# Patient Record
Sex: MALE | Race: WHITE | NOT HISPANIC OR LATINO | Employment: OTHER | URBAN - METROPOLITAN AREA
[De-identification: names, ages, dates, MRNs, and addresses within clinical notes are randomized per-mention and may not be internally consistent; named-entity substitution may affect disease eponyms.]

---

## 2017-02-23 ENCOUNTER — HOSPITAL ENCOUNTER (OUTPATIENT)
Facility: MEDICAL CENTER | Age: 52
End: 2017-02-23
Attending: FAMILY MEDICINE
Payer: COMMERCIAL

## 2017-02-23 ENCOUNTER — NON-PROVIDER VISIT (OUTPATIENT)
Dept: INTERNAL MEDICINE | Facility: IMAGING CENTER | Age: 52
End: 2017-02-23
Payer: COMMERCIAL

## 2017-02-23 DIAGNOSIS — Z01.89 ENCOUNTER FOR ROUTINE LABORATORY TESTING: Primary | ICD-10-CM

## 2017-02-23 DIAGNOSIS — E88.819 INSULIN RESISTANCE: ICD-10-CM

## 2017-02-23 DIAGNOSIS — R73.01 ELEVATED FASTING GLUCOSE: ICD-10-CM

## 2017-02-23 DIAGNOSIS — I77.6 INFLAMMATION OF ARTERIES (HCC): ICD-10-CM

## 2017-02-23 DIAGNOSIS — Z87.19 HISTORY OF LIVER DISEASE: ICD-10-CM

## 2017-02-23 DIAGNOSIS — E78.2 MIXED HYPERLIPIDEMIA WITH APOLIPOPROTEIN E3 VARIANT: ICD-10-CM

## 2017-02-23 LAB
ALBUMIN SERPL BCP-MCNC: 4.1 G/DL (ref 3.2–4.9)
ALBUMIN/GLOB SERPL: 1.3 G/DL
ALP SERPL-CCNC: 69 U/L (ref 30–99)
ALT SERPL-CCNC: 19 U/L (ref 2–50)
ANION GAP SERPL CALC-SCNC: 6 MMOL/L (ref 0–11.9)
AST SERPL-CCNC: 21 U/L (ref 12–45)
BILIRUB SERPL-MCNC: 1.5 MG/DL (ref 0.1–1.5)
BUN SERPL-MCNC: 15 MG/DL (ref 8–22)
CALCIUM SERPL-MCNC: 9.9 MG/DL (ref 8.5–10.5)
CHLORIDE SERPL-SCNC: 102 MMOL/L (ref 96–112)
CO2 SERPL-SCNC: 28 MMOL/L (ref 20–33)
CREAT SERPL-MCNC: 0.92 MG/DL (ref 0.5–1.4)
CREAT UR-MCNC: 67.3 MG/DL
CRP SERPL HS-MCNC: 0.7 MG/L (ref 0–7.5)
EST. AVERAGE GLUCOSE BLD GHB EST-MCNC: 100 MG/DL
GLOBULIN SER CALC-MCNC: 3.1 G/DL (ref 1.9–3.5)
GLUCOSE SERPL-MCNC: 94 MG/DL (ref 65–99)
HBA1C MFR BLD: 5.1 % (ref 0–5.6)
MICROALBUMIN UR-MCNC: <0.7 MG/DL
MICROALBUMIN/CREAT UR: NORMAL MG/G (ref 0–30)
POTASSIUM SERPL-SCNC: 4.1 MMOL/L (ref 3.6–5.5)
PROT SERPL-MCNC: 7.2 G/DL (ref 6–8.2)
SODIUM SERPL-SCNC: 136 MMOL/L (ref 135–145)

## 2017-02-23 PROCEDURE — 82172 ASSAY OF APOLIPOPROTEIN: CPT

## 2017-02-23 PROCEDURE — 82570 ASSAY OF URINE CREATININE: CPT

## 2017-02-23 PROCEDURE — 83036 HEMOGLOBIN GLYCOSYLATED A1C: CPT

## 2017-02-23 PROCEDURE — 83704 LIPOPROTEIN BLD QUAN PART: CPT

## 2017-02-23 PROCEDURE — 80061 LIPID PANEL: CPT

## 2017-02-23 PROCEDURE — 80053 COMPREHEN METABOLIC PANEL: CPT

## 2017-02-23 PROCEDURE — 86141 C-REACTIVE PROTEIN HS: CPT

## 2017-02-23 PROCEDURE — 82043 UR ALBUMIN QUANTITATIVE: CPT

## 2017-02-25 LAB — APO B100 SERPL-MCNC: 103 MG/DL (ref 55–140)

## 2017-02-28 LAB
CHOLEST SERPL-MCNC: 215 MG/DL (ref 100–199)
HDL PARTICAL NO Q4363: 28 UMOL/L
HDL SERPL QN: 8.9 NM
HDLC SERPL-MCNC: 48 MG/DL
HLD.LARGE SERPL-SCNC: 4.5 UMOL/L
LDL MED SERPL QN: 21.5 NM
LDL SERPL QN: 21.5 NM
LDL SERPL-SCNC: 1679 NMOL/L
LDL SMALL SERPL-SCNC: 354 NMOL/L
LDL SMALL SERPL-SCNC: 354 NMOL/L
LDLC SERPL CALC-MCNC: 136 MG/DL (ref 0–99)
LP IR SCORE Q4364: 41
TRIGL SERPL-MCNC: 156 MG/DL (ref 0–149)
VLDL LARGE SERPL-SCNC: 2.5 NMOL/L
VLDL SERPL QN: 45.3 NM

## 2017-03-22 ENCOUNTER — OFFICE VISIT (OUTPATIENT)
Dept: INTERNAL MEDICINE | Facility: IMAGING CENTER | Age: 52
End: 2017-03-22
Payer: COMMERCIAL

## 2017-03-22 VITALS
HEART RATE: 77 BPM | OXYGEN SATURATION: 99 % | HEIGHT: 71 IN | RESPIRATION RATE: 14 BRPM | TEMPERATURE: 97.2 F | SYSTOLIC BLOOD PRESSURE: 120 MMHG | DIASTOLIC BLOOD PRESSURE: 74 MMHG | BODY MASS INDEX: 25.9 KG/M2 | WEIGHT: 185 LBS

## 2017-03-22 DIAGNOSIS — Z83.3 FAMILY HISTORY OF DIABETES MELLITUS TYPE II: ICD-10-CM

## 2017-03-22 DIAGNOSIS — Z91.09 ENVIRONMENTAL ALLERGIES: ICD-10-CM

## 2017-03-22 DIAGNOSIS — F17.220 CHEWING TOBACCO NICOTINE DEPENDENCE WITHOUT COMPLICATION: ICD-10-CM

## 2017-03-22 DIAGNOSIS — E78.2 MIXED HYPERLIPIDEMIA WITH APOLIPOPROTEIN E3 VARIANT: Primary | ICD-10-CM

## 2017-03-22 PROCEDURE — 99214 OFFICE O/P EST MOD 30 MIN: CPT | Performed by: FAMILY MEDICINE

## 2017-03-22 RX ORDER — CETIRIZINE HYDROCHLORIDE 10 MG/1
10 TABLET ORAL DAILY
COMMUNITY
End: 2020-07-13

## 2017-03-22 RX ORDER — VARENICLINE TARTRATE 1 MG/1
1 TABLET, FILM COATED ORAL 2 TIMES DAILY
Qty: 60 TAB | Refills: 3 | Status: SHIPPED
Start: 2017-03-22 | End: 2018-04-10 | Stop reason: SDUPTHER

## 2017-03-22 NOTE — MR AVS SNAPSHOT
"Avila Wood   3/22/2017 8:30 AM   Office Visit   MRN: [id number]    Department:  [facility]   Dept Phone:  [phone]    Description:  Male : 1965   Provider:  Ja Rey M.D.           Reason for Visit     Allergic Reaction seasonal allergies    Hyperlipidemia     Nicotine Dependence with Chewing tobacco      Allergies as of 3/22/2017     No Known Allergies      You were diagnosed with     Mixed hyperlipidemia with apolipoprotein E3 variant   [152391]  -  Primary     Environmental allergies   [880444]       Chewing tobacco nicotine dependence without complication   [683554]       Family history of diabetes mellitus type II   [627992]         Vital Signs     Blood Pressure Pulse Temperature Respirations Height Weight    120/74 mmHg 77 36.2 °C (97.2 °F) 14 1.816 m (5' 11.5\") 83.915 kg (185 lb)    Body Mass Index Oxygen Saturation Smoking Status             25.45 kg/m2 99% Never Smoker          Basic Information     Date Of Birth Sex Race Ethnicity Preferred Language    1965 Male White Non- English      Problem List              ICD-10-CM Priority Class Noted - Resolved    Vitamin D deficiency disease E55.9   10/10/2013 - Present    Chewing tobacco use Z78.9   10/10/2013 - Present    Overweight (BMI 25.0-29.9) E66.3   10/10/2013 - Present    Family history of diabetes mellitus type II Z83.3   10/10/2013 - Present    History of LFT elevation Z87.19   10/10/2013 - Present    Environmental allergies Z91.09   10/10/2013 - Present    Family history of hypertension Z82.49   10/10/2013 - Present    Mixed hyperlipidemia with apolipoprotein E3 variant E78.2   2013 - Present    Inflammation of arteries (CMS-HCC) M30.0   2014 - Present    Elevated fasting glucose R73.01   2016 - Present    Insulin resistance E88.81   2016 - Present    Chewing tobacco nicotine dependence without complication F17.220   2016 - Present      Health Maintenance        Date Due " Completion Dates    IMM PNEUMOCOCCAL 19-64 (ADULT) MEDIUM RISK SERIES (1 of 1 - PPSV23) 3/27/1986 ---    IMM DTaP/Tdap/Td Vaccine (2 - Td) 10/10/2023 10/10/2013            Current Immunizations     Influenza Vaccine Quad Inj (Preserved) 9/27/2016, 10/13/2015, 11/26/2014    Tdap Vaccine 10/10/2013      Below and/or attached are the medications your provider expects you to take. Review all of your home medications and newly ordered medications with your provider and/or pharmacist. Follow medication instructions as directed by your provider and/or pharmacist. Please keep your medication list with you and share with your provider. Update the information when medications are discontinued, doses are changed, or new medications (including over-the-counter products) are added; and carry medication information at all times in the event of emergency situations     Allergies:  No Known Allergies          Medications  Valid as of: March 22, 2017 -  1:29 PM    Generic Name Brand Name Tablet Size Instructions for use    Aspirin (Tablet Delayed Response) ECOTRIN 81 MG Take 1 Tab by mouth every day.        Cetirizine HCl (Tab) ZYRTEC 10 MG Take 10 mg by mouth every day.        Cholecalciferol (Cap) vitamin D3 5000 UNITS Take 1 Cap by mouth every day.        Flaxseed Oil (Oil) Flaxseed Oil  Take 1 Each by mouth every day.        Multiple Vitamins-Minerals (Tab) CENTRUM  Take 1 Each by mouth every day.        Varenicline Tartrate (Misc) CHANTIX JOSE 0.5 MG X 11 & 1 MG X 42 As directed        Varenicline Tartrate (Tab) CHANTIX 1 MG Take 1 Tab by mouth 2 times a day.        .                 Medicines prescribed today were sent to:     [id number] - [place], [place] - [address] AT [facility]    [address] [address]    Phone: [phone] Fax: [phone]    Open 24 Hours?: No      Medication refill instructions:       If your prescription bottle indicates you have medication  refills left, it is not necessary to call your provider’s office. Please contact your pharmacy and they will refill your medication.    If your prescription bottle indicates you do not have any refills left, you may request refills at any time through one of the following ways: The online [employer] system (except Urgent Care), by calling your provider’s office, or by asking your pharmacy to contact your provider’s office with a refill request. Medication refills are processed only during regular business hours and may not be available until the next business day. Your provider may request additional information or to have a follow-up visit with you prior to refilling your medication.   *Please Note: Medication refills are assigned a new Rx number when refilled electronically. Your pharmacy may indicate that no refills were authorized even though a new prescription for the same medication is available at the pharmacy. Please request the medicine by name with the pharmacy before contacting your provider for a refill.        Instructions    Current Outpatient Prescriptions Ordered in [facility]   Medication Sig Dispense Refill   • cetirizine (ZYRTEC) 10 MG Tab Take 10 mg by mouth every day.     • varenicline (CHANTIX STARTING MONTH JOSE) 0.5 MG X 11 & 1 MG X 42 tablet As directed 56 Tab 3   • varenicline (CHANTIX CONTINUING MONTH JOSE) 1 MG tablet Take 1 Tab by mouth 2 times a day. 60 Tab 3   • Cholecalciferol (VITAMIN D3) 5000 UNITS CAPS Take 1 Cap by mouth every day.     • Flaxseed Oil OIL Take 1 Each by mouth every day.     • aspirin EC (ECOTRIN) 81 MG TBEC Take 1 Tab by mouth every day.     • Multiple Vitamins-Minerals (CENTRUM) TABS Take 1 Each by mouth every day.       No current [facility]-ordered facility-administered medications on file.                        Other Notes About Your Plan                          [employer] Access Code: Activation code not generated  Current [employer] Status: Active          Quit Tobacco  Information     Do you want to quit using tobacco?    Quitting tobacco decreases risks of cancer, heart and lung disease, increases life expectancy, improves sense of taste and smell, and increases spending money, among other benefits.    If you are thinking about quitting, we can help.  • [facility] Quit Tobacco Program: [phone]  o Program occurs weekly for four weeks and includes pharmacist consultation on products to support quitting smoking or chewing tobacco. A provider referral is needed for pharmacist consultation.  • Tobacco Users Help Hotline: 0-990-QUIT-NOW (981-0438) or https://[place].quitlogix.org/  o Free, confidential telephone and online coaching for [place] residents. Sessions are designed on a schedule that is convenient for you. Eligible clients receive free nicotine replacement therapy.  • Nationally: www.smokefree.gov  o Information and professional assistance to support both immediate and long-term needs as you become, and remain, a non-smoker. Smokefree.gov allows you to choose the help that best fits your needs.

## 2017-03-22 NOTE — PROGRESS NOTES
SUBJECTIVE:    Chief Complaint   Patient presents with   • Allergic Reaction     seasonal allergies   • Hyperlipidemia   • Nicotine Dependence     with Chewing tobacco       Avila Wood is a 51 y.o. male,   Established Patient     PROBLEM #1-HISTORY OF PRESENT ILLNESS  New Problem  PATIENT STATEMENT OF PROBLEM - Seasonal allergies  ONSET - 3 days  COURSE - scratchy throat, sneezing, itchy eyes. OTC Claritin worked well, but he tried a 24 hour Claritin D last PM and he did not like the racing mind, insomnia that went with it.   INTENSITY/STATUS/LOCATION/RADIATION - mild/ improved  AGGRAVATORS - seasonal  RELIEVERS - as above  TREATMENTS/COMPLIANCE/@GOAL? - same/ good/ mostly @ goal    PROBLEM #2-HISTORY OF PRESENT ILLNESS  Existing Problem, but requiring re-evaluation  PATIENT STATEMENT OF PROBLEM - Dyslipidemia  ONSET - years  COURSE - Asymptomatic. No CVD/CeVD event history. Previous Imaging reveals no evidence of atherosclerosis.  He is still chewing tobacco, and I strongly encouraged him to quit. Current pertinent labs:   HIGH & INT RISK: LDL-P/Tchol/L-HDL-P/TRIG/LDL-C/HDL-S/HDL-P/ApoB  INTENSITY/STATUS/LOCATION/RADIATION - moderate/ present  AGGRAVATORS - Multifactorial including genetics, tobacco, some high fat/calorie dense foods  RELIEVERS - he is normal weight via diet/exercise  TREATMENTS/COMPLIANCE/@GOAL? - same/ variable/ no     No Known Allergies    Patient Active Problem List    Diagnosis Date Noted   • Elevated fasting glucose 09/27/2016   • Insulin resistance 09/27/2016   • Chewing tobacco nicotine dependence without complication 09/27/2016   • Inflammation of arteries (CMS-HCC) 09/18/2014   • Mixed hyperlipidemia with apolipoprotein E3 variant 11/13/2013   • Vitamin D deficiency disease 10/10/2013   • Chewing tobacco use 10/10/2013   • Overweight (BMI 25.0-29.9) 10/10/2013   • Family history of diabetes mellitus type II 10/10/2013   • History of LFT elevation 10/10/2013   • Environmental allergies  "10/10/2013   • Family history of hypertension 10/10/2013       Outpatient Encounter Prescriptions as of 3/22/2017   Medication Sig Dispense Refill   • cetirizine (ZYRTEC) 10 MG Tab Take 10 mg by mouth every day.     • varenicline (CHANTIX STARTING MONTH PAK) 0.5 MG X 11 & 1 MG X 42 tablet As directed 56 Tab 3   • varenicline (CHANTIX CONTINUING MONTH PAK) 1 MG tablet Take 1 Tab by mouth 2 times a day. 60 Tab 3   • Cholecalciferol (VITAMIN D3) 5000 UNITS CAPS Take 1 Cap by mouth every day.     • Flaxseed Oil OIL Take 1 Each by mouth every day.     • aspirin EC (ECOTRIN) 81 MG TBEC Take 1 Tab by mouth every day.     • Multiple Vitamins-Minerals (CENTRUM) TABS Take 1 Each by mouth every day.       No facility-administered encounter medications on file as of 3/22/2017.       Social History   Substance Use Topics   • Smoking status: Never Smoker    • Smokeless tobacco: Current User     Types: Chew   • Alcohol Use: 1.2 oz/week     2 Standard drinks or equivalent per week       Family History   Problem Relation Age of Onset   • Diabetes Father    • Hypertension Father        Patient's Past, Social, and Family History reviewed and updated by me in EPIC today.    REVIEW OF SYMPTOMS:               Pertinent Positives as above.    All other systems reviewed and negative.     OBJECTIVE:    /74 mmHg  Pulse 77  Temp(Src) 36.2 °C (97.2 °F)  Resp 14  Ht 1.816 m (5' 11.5\")  Wt 83.915 kg (185 lb)  BMI 25.45 kg/m2  SpO2 99%  Body mass index is 25.45 kg/(m^2).    Well developed, well nourished male, no acute distress, non-ill appearing. Comfortable, appears stated age, pleasant and cooperative  HEAD: atraumatic, normocephalic   EYES: Conjunctiva normal, EOMI, PERRLA, acuity grossly intact.   EARS/NOSE/THROAT: TM's normal, no SSX of infection, no perforation, no hemotympanum, acuity grossly intact. Oropharynx: benign, no lesions noted. Nares: benign.   NECK: supple, no adenopathy, no thyromegaly or nodules, no JVD, no " carotid bruits.   CHEST/LUNGS: clear to auscultation and percussion bilaterally. No adventitious breath sounds.   CARDIOVASCULAR: regular rate and rhythm, no murmur. PMI not displaced. Good central and peripheral pulses.   BACK: no CVA tenderness.   ABDOMEN: soft, non-tender, non-distended, no masses, no hepatosplenomegaly. Normal active bowel tones.   : deferred.   Rectal: deferred.   Extremities: warm/well-perfused, no cyanosis, clubbing, or edema.   SKIN: clear, unbroken, no rashes, normal turgor.   Neuro: Mental Status: Alert and Oriented x 3. CN II-XII grossly intact. Gait normal. Non-focal, intact. Normal strength, sensation    ASSESSMENT:    1. Mixed hyperlipidemia with apolipoprotein E3 variant     2. Environmental allergies     3. Chewing tobacco nicotine dependence without complication  varenicline (CHANTIX STARTING MONTH PAK) 0.5 MG X 11 & 1 MG X 42 tablet    varenicline (CHANTIX CONTINUING MONTH JOSE) 1 MG tablet   4. Family history of diabetes mellitus type II         PLAN:    Total Face-to-Face time spent with patient: 30 minutes  Amount of time spent counseling patient and/or coordinating care: 20 minutes    The nature of patient counseling as below:  -Patient Education, including below topics:  -Differential Diagnoses and treatment options discussed  -Risks, benefits, alternatives discussed  -Labs reviewed with patient in detail  -Previous pertinent imaging reviewed with patient in detail  -Exercise  -Dietary recommendations discussed  -QUIT CHEWING TOBACCO!!!  -Therapeutic Lifestyle Changes discussed    The nature of coordination of care as below:  -Medications added: Chantix  -Medications discontinued: none  -Medications adjusted: none  -Continue (other) present chronic medications  -Seek medical attention immediately if worse    FOLLOW-UP:  -in 6 months  -and sooner if test/consult results warrant  And for Health Care Maintenance Exams  And as needed.

## 2017-03-22 NOTE — PATIENT INSTRUCTIONS
Current Outpatient Prescriptions Ordered in [facility]   Medication Sig Dispense Refill   • cetirizine (ZYRTEC) 10 MG Tab Take 10 mg by mouth every day.     • varenicline (CHANTIX STARTING MONTH PAK) 0.5 MG X 11 & 1 MG X 42 tablet As directed 56 Tab 3   • varenicline (CHANTIX CONTINUING MONTH PAK) 1 MG tablet Take 1 Tab by mouth 2 times a day. 60 Tab 3   • Cholecalciferol (VITAMIN D3) 5000 UNITS CAPS Take 1 Cap by mouth every day.     • Flaxseed Oil OIL Take 1 Each by mouth every day.     • aspirin EC (ECOTRIN) 81 MG TBEC Take 1 Tab by mouth every day.     • Multiple Vitamins-Minerals (CENTRUM) TABS Take 1 Each by mouth every day.       No current [facility]-ordered facility-administered medications on file.

## 2017-03-31 ENCOUNTER — OFFICE VISIT (OUTPATIENT)
Dept: INTERNAL MEDICINE | Facility: IMAGING CENTER | Age: 52
End: 2017-03-31
Payer: COMMERCIAL

## 2017-03-31 VITALS
OXYGEN SATURATION: 97 % | DIASTOLIC BLOOD PRESSURE: 60 MMHG | WEIGHT: 185 LBS | SYSTOLIC BLOOD PRESSURE: 110 MMHG | RESPIRATION RATE: 14 BRPM | BODY MASS INDEX: 25.9 KG/M2 | HEIGHT: 71 IN | TEMPERATURE: 98.6 F | HEART RATE: 83 BPM

## 2017-03-31 DIAGNOSIS — J01.00 ACUTE MAXILLARY SINUSITIS, RECURRENCE NOT SPECIFIED: ICD-10-CM

## 2017-03-31 DIAGNOSIS — L03.011 INFECTION OF FINGERNAIL OF RIGHT HAND: ICD-10-CM

## 2017-03-31 PROCEDURE — 99214 OFFICE O/P EST MOD 30 MIN: CPT | Performed by: FAMILY MEDICINE

## 2017-03-31 RX ORDER — AMOXICILLIN AND CLAVULANATE POTASSIUM 875; 125 MG/1; MG/1
1 TABLET, FILM COATED ORAL 2 TIMES DAILY
Qty: 20 TAB | Refills: 0 | Status: SHIPPED | OUTPATIENT
Start: 2017-03-31 | End: 2017-04-10

## 2017-03-31 NOTE — MR AVS SNAPSHOT
"Avila Wood   3/31/2017 2:30 PM   Office Visit   MRN: [id number]    Department:  [facility]   Dept Phone:  [phone]    Description:  Male : 1965   Provider:  Jayna Martinez M.D.           Reason for Visit     Sinus Problem     Finger Injury right third finger infection      Allergies as of 3/31/2017     No Known Allergies      You were diagnosed with     Acute maxillary sinusitis, recurrence not specified   [1672938]       Infection of fingernail of right hand   [4040351]         Vital Signs     Blood Pressure Pulse Temperature Respirations Height Weight    110/60 mmHg 83 37 °C (98.6 °F) 14 1.816 m (5' 11.5\") 83.915 kg (185 lb)    Body Mass Index Oxygen Saturation Smoking Status             25.45 kg/m2 97% Never Smoker          Basic Information     Date Of Birth Sex Race Ethnicity Preferred Language    1965 Male White Non- English      Problem List              ICD-10-CM Priority Class Noted - Resolved    Vitamin D deficiency disease E55.9   10/10/2013 - Present    Chewing tobacco use Z78.9   10/10/2013 - Present    Overweight (BMI 25.0-29.9) E66.3   10/10/2013 - Present    Family history of diabetes mellitus type II Z83.3   10/10/2013 - Present    History of LFT elevation Z87.19   10/10/2013 - Present    Environmental allergies Z91.09   10/10/2013 - Present    Family history of hypertension Z82.49   10/10/2013 - Present    Mixed hyperlipidemia with apolipoprotein E3 variant E78.2   2013 - Present    Inflammation of arteries (CMS-HCC) M30.0   2014 - Present    Elevated fasting glucose R73.01   2016 - Present    Insulin resistance E88.81   2016 - Present    Chewing tobacco nicotine dependence without complication F17.220   2016 - Present      Health Maintenance        Date Due Completion Dates    IMM PNEUMOCOCCAL 19-64 (ADULT) MEDIUM RISK SERIES (1 of 1 - PPSV23) 3/27/1986 ---    COLONOSCOPY 3/27/2017 ---    IMM DTaP/Tdap/Td Vaccine (2 - Td) " 10/10/2023 10/10/2013            Current Immunizations     Influenza Vaccine Quad Inj (Preserved) 9/27/2016, 10/13/2015, 11/26/2014    Tdap Vaccine 10/10/2013      Below and/or attached are the medications your provider expects you to take. Review all of your home medications and newly ordered medications with your provider and/or pharmacist. Follow medication instructions as directed by your provider and/or pharmacist. Please keep your medication list with you and share with your provider. Update the information when medications are discontinued, doses are changed, or new medications (including over-the-counter products) are added; and carry medication information at all times in the event of emergency situations     Allergies:  No Known Allergies          Medications  Valid as of: March 31, 2017 -  3:27 PM    Generic Name Brand Name Tablet Size Instructions for use    Amoxicillin-Pot Clavulanate (Tab) AUGMENTIN 875-125 MG Take 1 Tab by mouth 2 times a day for 10 days.        Aspirin (Tablet Delayed Response) ECOTRIN 81 MG Take 1 Tab by mouth every day.        Cetirizine HCl (Tab) ZYRTEC 10 MG Take 10 mg by mouth every day.        Cholecalciferol (Cap) vitamin D3 5000 UNITS Take 1 Cap by mouth every day.        Flaxseed Oil (Oil) Flaxseed Oil  Take 1 Each by mouth every day.        Multiple Vitamins-Minerals (Tab) CENTRUM  Take 1 Each by mouth every day.        Varenicline Tartrate (Misc) CHANTIX JOSE 0.5 MG X 11 & 1 MG X 42 As directed        Varenicline Tartrate (Tab) CHANTIX 1 MG Take 1 Tab by mouth 2 times a day.        .                 Medicines prescribed today were sent to:     [facility] [address] [place] [address], [place] - [address] [address] [facility] [address] [facility]    [address] [place] [address]    Phone: [phone] Fax: [phone]    Open 24 Hours?: No      Medication refill instructions:       If your prescription bottle indicates you have medication refills left, it is not  necessary to call your provider’s office. Please contact your pharmacy and they will refill your medication.    If your prescription bottle indicates you do not have any refills left, you may request refills at any time through one of the following ways: The online [employer] system (except Urgent Care), by calling your provider’s office, or by asking your pharmacy to contact your provider’s office with a refill request. Medication refills are processed only during regular business hours and may not be available until the next business day. Your provider may request additional information or to have a follow-up visit with you prior to refilling your medication.   *Please Note: Medication refills are assigned a new Rx number when refilled electronically. Your pharmacy may indicate that no refills were authorized even though a new prescription for the same medication is available at the pharmacy. Please request the medicine by name with the pharmacy before contacting your provider for a refill.        Other Notes About Your Plan                          [employer] Access Code: Activation code not generated  Current [employer] Status: Active          Quit Tobacco Information     Do you want to quit using tobacco?    Quitting tobacco decreases risks of cancer, heart and lung disease, increases life expectancy, improves sense of taste and smell, and increases spending money, among other benefits.    If you are thinking about quitting, we can help.  • [facility] Quit Tobacco Program: 883.697.7759  o Program occurs weekly for four weeks and includes pharmacist consultation on products to support quitting smoking or chewing tobacco. A provider referral is needed for pharmacist consultation.  • Tobacco Users Help Hotline: 0-686-QUIT-NOW (396-9765) or https://[place].quitlogix.org/  o Free, confidential telephone and online coaching for [place] residents. Sessions are designed on a schedule that is convenient for you. Eligible clients  receive free nicotine replacement therapy.  • Nationally: www.smokefree.gov  o Information and professional assistance to support both immediate and long-term needs as you become, and remain, a non-smoker. Smokefree.gov allows you to choose the help that best fits your needs.

## 2017-03-31 NOTE — PROGRESS NOTES
"Chief Complaint   Patient presents with   • Sinus Problem   • Finger Injury     right third finger infection       HPI:  Patient is a 52 y.o. male established patient of Dr. Rey who presents today for evaluation of new onset of sinus congestion/ R facial tenderness at cheek area/ significant nasal drainage/ general malaise for the past 10-14 days. He has tried multiple OTC medications without resolution of symptoms. He feels that this situation has gone beyond simple allergies. He also is complaining of new onset of R third finger sharp pain at lateral fingernail edge x 24 hrs. Pt denies trauma to area but does pick his fingers at times. He states that he has had minimal discharge from area since cutting his nails yesterday. Pt denies associated cough/N/V/D/urinary changes with both issues above. He denies sick contacts and no recent travel.     Patient Active Problem List    Diagnosis Date Noted   • Elevated fasting glucose 09/27/2016   • Insulin resistance 09/27/2016   • Chewing tobacco nicotine dependence without complication 09/27/2016   • Inflammation of arteries (CMS-[facility]) 09/18/2014   • Mixed hyperlipidemia with apolipoprotein E3 variant 11/13/2013   • Vitamin D deficiency disease 10/10/2013   • Chewing tobacco use 10/10/2013   • Overweight (BMI 25.0-29.9) 10/10/2013   • Family history of diabetes mellitus type II 10/10/2013   • History of LFT elevation 10/10/2013   • Environmental allergies 10/10/2013   • Family history of hypertension 10/10/2013       Past medical, surgical, family, and social history was reviewed in Epic chart by me today.     Medications and allergies reviewed and updated in Epic chart by me today.     ROS:  Pertinent positives listed above in HPI. All other systems have been reviewed and are negative.    PE:   /60 mmHg  Pulse 83  Temp(Src) 37 °C (98.6 °F)  Resp 14  Ht 1.816 m (5' 11.5\")  Wt 83.915 kg (185 lb)  BMI 25.45 kg/m2  SpO2 97%  Vital signs reviewed with patient. "     Gen: Well developed; well nourished; no acute distress; age appropriate appearance   HEENT: Normocephalic; atraumatic; PEERLA b/l; sclera clear b/l; b/l external auditory canals WNL; b/l TM WNL; oropharynx injected; oral mucosa moist; tongue midline; dentition adequate; nasal turbinates inflammed; R sinuses tender to touch;   Neck: No adenopathy; no thyromegaly  CV: Regular rate and rhythm; S1/ S2 present; no murmur, gallop or rub noted  Pulm: No respiratory distress; clear to ascultation b/l; no wheezing or stridor noted b/l  Abd: Adequate bowel sounds noted; soft and nontender; no rebound, rigidity, nor distention  Extremities: No peripheral edema b/l LE extremities/ no clubbing nor cyanosis noted; R third fingernail sharp and ragged at lateral edge/ associated point tenderness pain/minimal edema with palpation on area adjacent to lateral nail bed/ open skin noted/ no drainage noted/ no foreign object noted; pt able to move R hand and all fingers well.   Skin: Warm and dry; no rashes noted   Neuro: No focal deficits noted   Psych: AAOx4; mood and affect are appropriate    A/P:  1. Acute sinusitis  Rx given today/ advised rest/ adequate hydration/ continue flonase and OTC allergy medication as tolerated.   - amoxicillin-clavulanate (AUGMENTIN) 875-125 MG Tab; Take 1 Tab by mouth 2 times a day for 10 days.  Dispense: 20 Tab; Refill: 0    2. Infection of fingernail of right hand  Infection is actually in skin adjacent to fingernail. Rx given today to cover this and above issue. Also recommended hot water and Epson salt soaks/ protect open skin/ avoid picking skin area. Also recommended filing sharp nail edge when inflammation subsides.   - amoxicillin-clavulanate (AUGMENTIN) 875-125 MG Tab; Take 1 Tab by mouth 2 times a day for 10 days.  Dispense: 20 Tab; Refill: 0      Pt will contact our office directly if above conditions do not improve with treatment plan. He is to continue regularly scheduled f/u with   Jordan

## 2017-11-15 ENCOUNTER — NON-PROVIDER VISIT (OUTPATIENT)
Dept: INTERNAL MEDICINE | Facility: IMAGING CENTER | Age: 52
End: 2017-11-15
Payer: COMMERCIAL

## 2017-11-15 DIAGNOSIS — Z23 NEED FOR INFLUENZA VACCINATION: ICD-10-CM

## 2017-11-15 PROCEDURE — 90686 IIV4 VACC NO PRSV 0.5 ML IM: CPT | Performed by: FAMILY MEDICINE

## 2017-11-15 PROCEDURE — 90471 IMMUNIZATION ADMIN: CPT | Performed by: FAMILY MEDICINE

## 2018-03-17 ENCOUNTER — HOSPITAL ENCOUNTER (OUTPATIENT)
Facility: MEDICAL CENTER | Age: 53
End: 2018-03-17
Attending: FAMILY MEDICINE
Payer: COMMERCIAL

## 2018-03-17 PROCEDURE — 82274 ASSAY TEST FOR BLOOD FECAL: CPT

## 2018-03-21 LAB — HEMOCCULT STL QL IA: NEGATIVE

## 2018-03-30 DIAGNOSIS — Z00.00 HEALTH CARE MAINTENANCE: ICD-10-CM

## 2018-03-30 DIAGNOSIS — E55.9 VITAMIN D DEFICIENCY DISEASE: ICD-10-CM

## 2018-03-30 DIAGNOSIS — E78.2 MIXED HYPERLIPIDEMIA WITH APOLIPOPROTEIN E3 VARIANT: ICD-10-CM

## 2018-03-30 DIAGNOSIS — R73.01 ELEVATED FASTING GLUCOSE: ICD-10-CM

## 2018-03-30 DIAGNOSIS — Z87.19 HISTORY OF LIVER DISEASE: ICD-10-CM

## 2018-03-30 DIAGNOSIS — E88.819 INSULIN RESISTANCE: ICD-10-CM

## 2018-03-30 DIAGNOSIS — Z12.5 SCREENING FOR PROSTATE CANCER: ICD-10-CM

## 2018-04-05 ENCOUNTER — NON-PROVIDER VISIT (OUTPATIENT)
Dept: INTERNAL MEDICINE | Facility: IMAGING CENTER | Age: 53
End: 2018-04-05
Payer: COMMERCIAL

## 2018-04-05 ENCOUNTER — HOSPITAL ENCOUNTER (OUTPATIENT)
Facility: MEDICAL CENTER | Age: 53
End: 2018-04-05
Attending: FAMILY MEDICINE
Payer: COMMERCIAL

## 2018-04-05 DIAGNOSIS — E55.9 VITAMIN D DEFICIENCY DISEASE: ICD-10-CM

## 2018-04-05 DIAGNOSIS — E78.2 MIXED HYPERLIPIDEMIA WITH APOLIPOPROTEIN E3 VARIANT: ICD-10-CM

## 2018-04-05 DIAGNOSIS — Z12.5 SCREENING FOR PROSTATE CANCER: ICD-10-CM

## 2018-04-05 DIAGNOSIS — R73.01 ELEVATED FASTING GLUCOSE: ICD-10-CM

## 2018-04-05 DIAGNOSIS — Z00.00 HEALTH CARE MAINTENANCE: ICD-10-CM

## 2018-04-05 DIAGNOSIS — E88.819 INSULIN RESISTANCE: ICD-10-CM

## 2018-04-05 DIAGNOSIS — Z87.19 HISTORY OF LIVER DISEASE: ICD-10-CM

## 2018-04-05 DIAGNOSIS — Z01.89 ENCOUNTER FOR ROUTINE LABORATORY TESTING: ICD-10-CM

## 2018-04-05 LAB
25(OH)D3 SERPL-MCNC: 27 NG/ML (ref 30–100)
ALBUMIN SERPL BCP-MCNC: 4.5 G/DL (ref 3.2–4.9)
ALBUMIN/GLOB SERPL: 1.6 G/DL
ALP SERPL-CCNC: 74 U/L (ref 30–99)
ALT SERPL-CCNC: 24 U/L (ref 2–50)
ANION GAP SERPL CALC-SCNC: 5 MMOL/L (ref 0–11.9)
AST SERPL-CCNC: 21 U/L (ref 12–45)
BASOPHILS # BLD AUTO: 0.5 % (ref 0–1.8)
BASOPHILS # BLD: 0.03 K/UL (ref 0–0.12)
BILIRUB SERPL-MCNC: 1.3 MG/DL (ref 0.1–1.5)
BUN SERPL-MCNC: 16 MG/DL (ref 8–22)
CALCIUM SERPL-MCNC: 9.6 MG/DL (ref 8.5–10.5)
CHLORIDE SERPL-SCNC: 106 MMOL/L (ref 96–112)
CHOLEST SERPL-MCNC: 217 MG/DL (ref 100–199)
CO2 SERPL-SCNC: 26 MMOL/L (ref 20–33)
CREAT SERPL-MCNC: 0.78 MG/DL (ref 0.5–1.4)
EOSINOPHIL # BLD AUTO: 0.12 K/UL (ref 0–0.51)
EOSINOPHIL NFR BLD: 1.9 % (ref 0–6.9)
ERYTHROCYTE [DISTWIDTH] IN BLOOD BY AUTOMATED COUNT: 41.8 FL (ref 35.9–50)
EST. AVERAGE GLUCOSE BLD GHB EST-MCNC: 108 MG/DL
GLOBULIN SER CALC-MCNC: 2.9 G/DL (ref 1.9–3.5)
GLUCOSE SERPL-MCNC: 99 MG/DL (ref 65–99)
HBA1C MFR BLD: 5.4 % (ref 0–5.6)
HCT VFR BLD AUTO: 47.6 % (ref 42–52)
HDLC SERPL-MCNC: 53 MG/DL
HGB BLD-MCNC: 16.3 G/DL (ref 14–18)
IMM GRANULOCYTES # BLD AUTO: 0.02 K/UL (ref 0–0.11)
IMM GRANULOCYTES NFR BLD AUTO: 0.3 % (ref 0–0.9)
LDLC SERPL CALC-MCNC: 138 MG/DL
LYMPHOCYTES # BLD AUTO: 1.54 K/UL (ref 1–4.8)
LYMPHOCYTES NFR BLD: 23.8 % (ref 22–41)
MCH RBC QN AUTO: 32 PG (ref 27–33)
MCHC RBC AUTO-ENTMCNC: 34.2 G/DL (ref 33.7–35.3)
MCV RBC AUTO: 93.3 FL (ref 81.4–97.8)
MONOCYTES # BLD AUTO: 0.49 K/UL (ref 0–0.85)
MONOCYTES NFR BLD AUTO: 7.6 % (ref 0–13.4)
NEUTROPHILS # BLD AUTO: 4.26 K/UL (ref 1.82–7.42)
NEUTROPHILS NFR BLD: 65.9 % (ref 44–72)
NRBC # BLD AUTO: 0 K/UL
NRBC BLD-RTO: 0 /100 WBC
PLATELET # BLD AUTO: 185 K/UL (ref 164–446)
PMV BLD AUTO: 10.7 FL (ref 9–12.9)
POTASSIUM SERPL-SCNC: 4.1 MMOL/L (ref 3.6–5.5)
PROT SERPL-MCNC: 7.4 G/DL (ref 6–8.2)
PSA SERPL-MCNC: 2.08 NG/ML (ref 0–4)
RBC # BLD AUTO: 5.1 M/UL (ref 4.7–6.1)
SODIUM SERPL-SCNC: 137 MMOL/L (ref 135–145)
TRIGL SERPL-MCNC: 132 MG/DL (ref 0–149)
TSH SERPL DL<=0.005 MIU/L-ACNC: 1.21 UIU/ML (ref 0.38–5.33)
WBC # BLD AUTO: 6.5 K/UL (ref 4.8–10.8)

## 2018-04-05 PROCEDURE — 84153 ASSAY OF PSA TOTAL: CPT

## 2018-04-05 PROCEDURE — 80053 COMPREHEN METABOLIC PANEL: CPT

## 2018-04-05 PROCEDURE — 80061 LIPID PANEL: CPT

## 2018-04-05 PROCEDURE — 83036 HEMOGLOBIN GLYCOSYLATED A1C: CPT

## 2018-04-05 PROCEDURE — 84443 ASSAY THYROID STIM HORMONE: CPT

## 2018-04-05 PROCEDURE — 85025 COMPLETE CBC W/AUTO DIFF WBC: CPT

## 2018-04-05 PROCEDURE — 82306 VITAMIN D 25 HYDROXY: CPT

## 2018-04-10 ENCOUNTER — OFFICE VISIT (OUTPATIENT)
Dept: INTERNAL MEDICINE | Facility: IMAGING CENTER | Age: 53
End: 2018-04-10
Payer: COMMERCIAL

## 2018-04-10 VITALS
HEIGHT: 71 IN | HEART RATE: 62 BPM | BODY MASS INDEX: 27.16 KG/M2 | DIASTOLIC BLOOD PRESSURE: 82 MMHG | OXYGEN SATURATION: 96 % | WEIGHT: 194 LBS | TEMPERATURE: 98.2 F | SYSTOLIC BLOOD PRESSURE: 132 MMHG | RESPIRATION RATE: 14 BRPM

## 2018-04-10 DIAGNOSIS — Z91.09 ENVIRONMENTAL ALLERGIES: Chronic | ICD-10-CM

## 2018-04-10 DIAGNOSIS — E88.819 INSULIN RESISTANCE: Chronic | ICD-10-CM

## 2018-04-10 DIAGNOSIS — Z72.0 CHEWING TOBACCO USE: ICD-10-CM

## 2018-04-10 DIAGNOSIS — E78.2 MIXED HYPERLIPIDEMIA WITH APOLIPOPROTEIN E3 VARIANT: Chronic | ICD-10-CM

## 2018-04-10 DIAGNOSIS — E55.9 VITAMIN D DEFICIENCY DISEASE: Chronic | ICD-10-CM

## 2018-04-10 DIAGNOSIS — Z00.00 WELLNESS EXAMINATION: ICD-10-CM

## 2018-04-10 PROCEDURE — 99396 PREV VISIT EST AGE 40-64: CPT | Performed by: FAMILY MEDICINE

## 2018-04-10 RX ORDER — VARENICLINE TARTRATE 1 MG/1
1 TABLET, FILM COATED ORAL 2 TIMES DAILY
Qty: 60 TAB | Refills: 3 | Status: SHIPPED | OUTPATIENT
Start: 2018-04-10 | End: 2020-07-13

## 2018-04-11 RX ORDER — CHLORAL HYDRATE 500 MG
1000 CAPSULE ORAL
COMMUNITY
End: 2020-07-13

## 2018-04-11 NOTE — PROGRESS NOTES
"Chief Complaint   Patient presents with   • Annual Exam       HPI:  Patient is a 53 y.o. male established patient who presents today for his annual exam and recent lab review. He continues to use chewing tobacco despite having a period of cessation with Chantix use last year. He has recommitted himself to quitting and would like to use Chantix again for assistance. He tolerated the medication well and did not have any perceived side effects. He has chronic insulin resistance with normal HgA1c recently, and chronic vitamin D deficiency with recent increase of daily Vitamin D3 use to 5000 IU.  He has chronic mixed dyslipidemia and prefers to avoid statin use. He does take daily fish oil and is requesting nutrition consult to improve his dietary habits. He has gained 9 lbs since his last visit and is exercising more recently. He reports no issue with environmental allergies at this time and is otherwise doing well.     Patient Active Problem List    Diagnosis Date Noted   • Insulin resistance 09/27/2016   • Mixed hyperlipidemia with apolipoprotein E3 variant 11/13/2013   • Vitamin D deficiency disease 10/10/2013   • Chewing tobacco use 10/10/2013   • BMI 27.0-27.9,adult 10/10/2013   • Family history of diabetes mellitus type II 10/10/2013   • Environmental allergies 10/10/2013     Past medical, surgical, family, and social history was reviewed in Epic chart by me today.     Medications and allergies reviewed and updated in Epic chart by me today.     ROS:  Pertinent positives listed above in HPI. All other systems have been reviewed and are negative.    Lab results from 4/5/18 reviewed with patient at visit today.     PE:   /82   Pulse 62   Temp 36.8 °C (98.2 °F)   Resp 14   Ht 1.803 m (5' 11\")   Wt 88 kg (194 lb)   SpO2 96%   BMI 27.06 kg/m²   Vital signs reviewed with patient.     Gen: Well developed; well nourished; no acute distress; age appropriate appearance   HEENT: Normocephalic; atraumatic; PEERLA " b/l; sclera clear b/l; b/l external auditory canals WNL; b/l TM WNL; oropharynx clear; oral mucosa moist; tongue midline; dentition adequate   Neck: No adenopathy; no thyromegaly  CV: Regular rate and rhythm; S1/ S2 present; no murmur, gallop or rub noted  Pulm: No respiratory distress; clear to ascultation b/l; no wheezing or stridor noted b/l  Abd: Adequate bowel sounds noted; soft and nontender; no rebound, rigidity, nor distention  Extremities: No peripheral edema b/l LE extremities/ no clubbing nor cyanosis noted  Skin: Warm and dry; no rashes noted   Neuro: No focal deficits noted   Psych: AAOx4; mood and affect are appropriate  Rectal exam: nl sphincter tone/ no masses noted/ prostate is smooth and non tender/ minimal brown stool in vault - formal FIT test negative 3/18      A/P:  1. Chronic chewing tobacco use  Pt is now motivated to quit daily use and support provided. RX sent to pharmacy today for patient.   - varenicline (CHANTIX CONTINUING MONTH JOSE) 1 MG tablet; Take 1 Tab by mouth 2 times a day.  Dispense: 60 Tab; Refill: 3  - varenicline (CHANTIX STARTING MONTH JOSE) 0.5 MG X 11 & 1 MG X 42 tablet; Take tablets by mouth as directed.  Dispense: 56 Tab; Refill: 3    2. Chronic vitamin D deficiency disease  Uncontrolled/ pt is to continue Vitamin D3 5000 IU daily (he recently increased daily dose).    3. Chronic insulin resistance  Stable/ recent HgA1c WNL; I support pt's request for nutrition counseling and will make referral.   - REFERRAL TO NUTRITION SERVICES    4. Chronic mixed hyperlipidemia with apolipoprotein E3 variant  Uncontrolled/ pt is not open to taking statin medication at this time. Recommend that he continue daily fish oil and add red yeast rice 600 mg BID. Repeat fasting lipid panel in 4-6 months after making lifestyle changes.   - REFERRAL TO NUTRITION SERVICES    5. Chronic environmental allergies  Stable/ well controlled currently/ pt to continue daily zyrtec.     6. Wellness  examination  Pt is UTD with all HCM items at this time.     7. BMI 27.0-27.9,adult  Pt encouraged to focus on at least 5-10% weight loss to improve his other chronic medical conditions.     Pt is stable at this time and is to return in 4-6 months for fasting lipid panel after making lifestyle modifications.

## 2018-07-10 ENCOUNTER — APPOINTMENT (OUTPATIENT)
Dept: HEALTH INFORMATION MANAGEMENT | Facility: MEDICAL CENTER | Age: 53
End: 2018-07-10
Payer: COMMERCIAL

## 2018-09-18 ENCOUNTER — OFFICE VISIT (OUTPATIENT)
Dept: INTERNAL MEDICINE | Facility: IMAGING CENTER | Age: 53
End: 2018-09-18
Payer: COMMERCIAL

## 2018-09-18 VITALS
BODY MASS INDEX: 27.3 KG/M2 | HEART RATE: 77 BPM | WEIGHT: 195 LBS | DIASTOLIC BLOOD PRESSURE: 62 MMHG | OXYGEN SATURATION: 97 % | HEIGHT: 71 IN | RESPIRATION RATE: 14 BRPM | SYSTOLIC BLOOD PRESSURE: 118 MMHG | TEMPERATURE: 98.2 F

## 2018-09-18 DIAGNOSIS — H66.92 LEFT OTITIS MEDIA, UNSPECIFIED OTITIS MEDIA TYPE: ICD-10-CM

## 2018-09-18 DIAGNOSIS — Z71.85 VACCINE COUNSELING: ICD-10-CM

## 2018-09-18 PROCEDURE — 99214 OFFICE O/P EST MOD 30 MIN: CPT | Performed by: FAMILY MEDICINE

## 2018-09-18 RX ORDER — AMOXICILLIN AND CLAVULANATE POTASSIUM 875; 125 MG/1; MG/1
1 TABLET, FILM COATED ORAL 2 TIMES DAILY
Qty: 14 TAB | Refills: 0 | Status: SHIPPED | OUTPATIENT
Start: 2018-09-18 | End: 2018-09-25

## 2018-09-18 NOTE — PROGRESS NOTES
"Chief Complaint   Patient presents with   • Otalgia     left ear   • Sinus Problem     three weeks of sinus congestion/ drainage       HPI:  Patient is a 53 y.o. male established patient who presents today for evaluation of new illness symptoms for the past three weeks. He reports an episode of vomiting and abdominal pain due to gas three weeks ago that resolved with supportive care measures. He then developed a cold sore on his face followed by sinus congestion, drainage, and sensation of \"blocked\" left ear. This condition has now progressed into a week of left ear pain unrelieved with ear candling, neti pot use, advil, OTC sinus medication, and daily zyrtec use. He denies current N/V/D/F/bowel or bladder changes and is otherwise stable at this time. He continues to fly frequently with many potential sick contacts.     Patient Active Problem List    Diagnosis Date Noted   • Insulin resistance 09/27/2016   • Mixed hyperlipidemia with apolipoprotein E3 variant 11/13/2013   • Vitamin D deficiency disease 10/10/2013   • Chewing tobacco use 10/10/2013   • BMI 27.0-27.9,adult 10/10/2013   • Family history of diabetes mellitus type II 10/10/2013   • Environmental allergies 10/10/2013       Past medical, surgical, family, and social history was reviewed in Epic chart by me today.     Medications and allergies reviewed and updated in Epic chart by me today.     ROS:  Pertinent positives listed above in HPI. All other systems have been reviewed and are negative.    PE:   /62   Pulse 77   Temp 36.8 °C (98.2 °F)   Resp 14   Ht 1.803 m (5' 10.98\")   Wt 88.5 kg (195 lb)   SpO2 97%   BMI 27.21 kg/m²   Vital signs reviewed with patient.     Gen: Well developed; well nourished; no acute distress; tired but non toxic appearance   HEENT: Normocephalic; atraumatic; PEERLA b/l; sclera clear b/l; b/l external auditory canals WNL; R TM WNL; L TM has bright red right edge and is bulging/ dull; nares patent; oropharynx clear; " oral mucosa moist; tongue midline; dentition adequate; no sinus tenderness with percussion; pt is congested throughout our visit  Neck: No adenopathy; no thyromegaly  CV: Regular rate and rhythm; S1/ S2 present; no murmur, gallop or rub noted  Pulm: No respiratory distress; clear to ascultation b/l; no wheezing or stridor noted b/l  Abd: Adequate bowel sounds noted; soft and nontender; no rebound, rigidity, nor distention  Extremities: No peripheral edema b/l LE extremities/ no clubbing nor cyanosis noted  Skin: Warm and dry; no rashes noted   Neuro: No focal deficits noted   Psych: AAOx4; mood and affect are appropriate    A/P:  1. Left otitis media, unspecified otitis media type  Pt has has ongoing symptoms for three weeks and has failed numerous OTC treatments. Recommend starting abx use today (with food), use flonase BID for the next 7 days at minimum, maintain adequate hydration, rest, continue daily zyrtec use, and ok to continue neti pot/ OTC medications PRN for symptom relief. Pt educated that treatment should resolve his reported hearing changes but if this condition persists, he needs to contact me for further medical management.   - amoxicillin-clavulanate (AUGMENTIN) 875-125 MG Tab; Take 1 Tab by mouth 2 times a day for 7 days.  Dispense: 14 Tab; Refill: 0    2. Vaccine counseling  Pt will obtain annual flu vaccine when his current illness has resolved and was provided with Shingrix information at visit today.    Pt is to call our office if current condition does not resolve with treatment plans detailed above.

## 2018-09-27 ENCOUNTER — OFFICE VISIT (OUTPATIENT)
Dept: INTERNAL MEDICINE | Facility: IMAGING CENTER | Age: 53
End: 2018-09-27
Payer: COMMERCIAL

## 2018-09-27 VITALS
HEART RATE: 76 BPM | OXYGEN SATURATION: 95 % | WEIGHT: 195 LBS | BODY MASS INDEX: 27.3 KG/M2 | TEMPERATURE: 98.4 F | HEIGHT: 71 IN | RESPIRATION RATE: 14 BRPM | SYSTOLIC BLOOD PRESSURE: 120 MMHG | DIASTOLIC BLOOD PRESSURE: 76 MMHG

## 2018-09-27 DIAGNOSIS — H91.92 HEARING LOSS OF LEFT EAR, UNSPECIFIED HEARING LOSS TYPE: ICD-10-CM

## 2018-09-27 DIAGNOSIS — Z86.69 HISTORY OF OTITIS MEDIA: ICD-10-CM

## 2018-09-27 PROCEDURE — 99213 OFFICE O/P EST LOW 20 MIN: CPT | Performed by: FAMILY MEDICINE

## 2018-09-27 ASSESSMENT — PATIENT HEALTH QUESTIONNAIRE - PHQ9: CLINICAL INTERPRETATION OF PHQ2 SCORE: 0

## 2018-09-27 NOTE — PROGRESS NOTES
"Chief Complaint   Patient presents with   • Ear Fullness     left ear still feels plugged       HPI:  Patient is a 53 y.o. male established patient who presents today to for evaluation of ongoing left ear sensation of \"feeling plugged/decreased hearing\" that persists after completing Augmentin 7 day treatment for URI/ left otitis media (office visit 9/18/19). He does report significant improvement with URI symptoms and is no longer having sinus congestion and drainage. 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 He will defer his annual flu shot today and is otherwise stable.     Patient Active Problem List    Diagnosis Date Noted   • Insulin resistance 09/27/2016   • Mixed hyperlipidemia with apolipoprotein E3 variant 11/13/2013   • Vitamin D deficiency disease 10/10/2013   • Chewing tobacco use 10/10/2013   • BMI 27.0-27.9,adult 10/10/2013   • Family history of diabetes mellitus type II 10/10/2013   • Environmental allergies 10/10/2013     Past medical, surgical, family, and social history was reviewed in Epic chart by me today.     Medications and allergies reviewed and updated in Epic chart by me today.     ROS:  Pertinent positives listed above in HPI. All other systems have been reviewed and are negative.    PE:   /76 (BP Location: Left arm, Patient Position: Sitting, BP Cuff Size: Adult)   Pulse 76   Temp 36.9 °C (98.4 °F) (Temporal)   Resp 14   Ht 1.803 m (5' 11\")   Wt 88.5 kg (195 lb)   SpO2 95%   BMI 27.20 kg/m²   Vital signs reviewed with patient.     Gen: Well developed; well nourished; no acute distress; well appearance   HEENT: Normocephalic; atraumatic; PEERLA b/l; sclera clear b/l; b/l external auditory canals WNL; L TM is not bright red/bulging as on previous visit but both TM have partial dullness present ; nares patent; oropharynx clear; oral mucosa moist; tongue midline; " dentition adequate; no sinus discomfort with percussion  Neck: No adenopathy; no thyromegaly  CV: Regular rate and rhythm; S1/ S2 present; no murmur, gallop or rub noted  Pulm: No respiratory distress; clear to ascultation b/l; no wheezing or stridor noted b/l  Abd: Adequate bowel sounds noted; soft and nontender; no rebound, rigidity, nor distention   Extremities: No peripheral edema b/l LE extremities/ no clubbing nor cyanosis noted  Skin: Warm and dry; no rashes noted   Neuro: No focal deficits noted; normal speech pattern and no obvious hearing deficits during visit today  Psych: AAOx4; mood and affect are appropriate    A/P:  1. Hearing loss of left ear, unspecified hearing loss type/ History of otitis media  Ongoing symptoms despite recent completion of 7 days Augmentin. Pt is to continue all OTC medications/ neti pot use and will refer to Dr. Zhu for specialty evaluation. Pt was provide with his office number to schedule appointment in near future.    - REFERRAL TO ENT    Pt is to call our office if he encounters problems with referral and will return at a later date for annual flu vaccine.

## 2019-07-25 DIAGNOSIS — Z12.5 SCREENING FOR PROSTATE CANCER: ICD-10-CM

## 2019-07-25 DIAGNOSIS — Z00.00 HEALTH CARE MAINTENANCE: ICD-10-CM

## 2019-07-25 DIAGNOSIS — E88.819 INSULIN RESISTANCE: Chronic | ICD-10-CM

## 2019-07-25 DIAGNOSIS — E78.2 MIXED HYPERLIPIDEMIA WITH APOLIPOPROTEIN E3 VARIANT: Chronic | ICD-10-CM

## 2019-07-25 DIAGNOSIS — E55.9 VITAMIN D DEFICIENCY DISEASE: Chronic | ICD-10-CM

## 2019-07-26 ENCOUNTER — NON-PROVIDER VISIT (OUTPATIENT)
Dept: INTERNAL MEDICINE | Facility: IMAGING CENTER | Age: 54
End: 2019-07-26
Payer: COMMERCIAL

## 2019-07-26 ENCOUNTER — HOSPITAL ENCOUNTER (OUTPATIENT)
Facility: MEDICAL CENTER | Age: 54
End: 2019-07-26
Attending: FAMILY MEDICINE
Payer: COMMERCIAL

## 2019-07-26 DIAGNOSIS — Z12.5 SCREENING FOR PROSTATE CANCER: ICD-10-CM

## 2019-07-26 DIAGNOSIS — Z00.00 HEALTH CARE MAINTENANCE: ICD-10-CM

## 2019-07-26 DIAGNOSIS — E88.819 INSULIN RESISTANCE: Chronic | ICD-10-CM

## 2019-07-26 DIAGNOSIS — E78.2 MIXED HYPERLIPIDEMIA WITH APOLIPOPROTEIN E3 VARIANT: Chronic | ICD-10-CM

## 2019-07-26 DIAGNOSIS — E55.9 VITAMIN D DEFICIENCY DISEASE: Chronic | ICD-10-CM

## 2019-07-26 LAB
25(OH)D3 SERPL-MCNC: 42 NG/ML (ref 30–100)
ALBUMIN SERPL BCP-MCNC: 4.4 G/DL (ref 3.2–4.9)
ALBUMIN/GLOB SERPL: 1.3 G/DL
ALP SERPL-CCNC: 74 U/L (ref 30–99)
ALT SERPL-CCNC: 36 U/L (ref 2–50)
ANION GAP SERPL CALC-SCNC: 7 MMOL/L (ref 0–11.9)
AST SERPL-CCNC: 31 U/L (ref 12–45)
BASOPHILS # BLD AUTO: 0.8 % (ref 0–1.8)
BASOPHILS # BLD: 0.05 K/UL (ref 0–0.12)
BILIRUB SERPL-MCNC: 1.6 MG/DL (ref 0.1–1.5)
BUN SERPL-MCNC: 14 MG/DL (ref 8–22)
CALCIUM SERPL-MCNC: 9.3 MG/DL (ref 8.5–10.5)
CHLORIDE SERPL-SCNC: 105 MMOL/L (ref 96–112)
CHOLEST SERPL-MCNC: 219 MG/DL (ref 100–199)
CO2 SERPL-SCNC: 25 MMOL/L (ref 20–33)
CREAT SERPL-MCNC: 0.82 MG/DL (ref 0.5–1.4)
EOSINOPHIL # BLD AUTO: 0.35 K/UL (ref 0–0.51)
EOSINOPHIL NFR BLD: 5.6 % (ref 0–6.9)
ERYTHROCYTE [DISTWIDTH] IN BLOOD BY AUTOMATED COUNT: 42.5 FL (ref 35.9–50)
EST. AVERAGE GLUCOSE BLD GHB EST-MCNC: 105 MG/DL
GLOBULIN SER CALC-MCNC: 3.3 G/DL (ref 1.9–3.5)
GLUCOSE SERPL-MCNC: 104 MG/DL (ref 65–99)
HBA1C MFR BLD: 5.3 % (ref 0–5.6)
HCT VFR BLD AUTO: 47.6 % (ref 42–52)
HDLC SERPL-MCNC: 48 MG/DL
HGB BLD-MCNC: 16.5 G/DL (ref 14–18)
IMM GRANULOCYTES # BLD AUTO: 0.01 K/UL (ref 0–0.11)
IMM GRANULOCYTES NFR BLD AUTO: 0.2 % (ref 0–0.9)
LDLC SERPL CALC-MCNC: 142 MG/DL
LYMPHOCYTES # BLD AUTO: 1.8 K/UL (ref 1–4.8)
LYMPHOCYTES NFR BLD: 28.7 % (ref 22–41)
MCH RBC QN AUTO: 32.7 PG (ref 27–33)
MCHC RBC AUTO-ENTMCNC: 34.7 G/DL (ref 33.7–35.3)
MCV RBC AUTO: 94.4 FL (ref 81.4–97.8)
MONOCYTES # BLD AUTO: 0.58 K/UL (ref 0–0.85)
MONOCYTES NFR BLD AUTO: 9.2 % (ref 0–13.4)
NEUTROPHILS # BLD AUTO: 3.49 K/UL (ref 1.82–7.42)
NEUTROPHILS NFR BLD: 55.5 % (ref 44–72)
NRBC # BLD AUTO: 0 K/UL
NRBC BLD-RTO: 0 /100 WBC
PLATELET # BLD AUTO: 197 K/UL (ref 164–446)
PMV BLD AUTO: 10.9 FL (ref 9–12.9)
POTASSIUM SERPL-SCNC: 4 MMOL/L (ref 3.6–5.5)
PROT SERPL-MCNC: 7.7 G/DL (ref 6–8.2)
PSA SERPL-MCNC: 2.23 NG/ML (ref 0–4)
RBC # BLD AUTO: 5.04 M/UL (ref 4.7–6.1)
SODIUM SERPL-SCNC: 137 MMOL/L (ref 135–145)
TRIGL SERPL-MCNC: 147 MG/DL (ref 0–149)
TSH SERPL DL<=0.005 MIU/L-ACNC: 1.76 UIU/ML (ref 0.38–5.33)
WBC # BLD AUTO: 6.3 K/UL (ref 4.8–10.8)

## 2019-07-26 PROCEDURE — 83036 HEMOGLOBIN GLYCOSYLATED A1C: CPT

## 2019-07-26 PROCEDURE — 85025 COMPLETE CBC W/AUTO DIFF WBC: CPT

## 2019-07-26 PROCEDURE — 84153 ASSAY OF PSA TOTAL: CPT

## 2019-07-26 PROCEDURE — 82306 VITAMIN D 25 HYDROXY: CPT

## 2019-07-26 PROCEDURE — 80053 COMPREHEN METABOLIC PANEL: CPT

## 2019-07-26 PROCEDURE — 84443 ASSAY THYROID STIM HORMONE: CPT

## 2019-07-26 PROCEDURE — 80061 LIPID PANEL: CPT

## 2019-07-29 ENCOUNTER — OFFICE VISIT (OUTPATIENT)
Dept: INTERNAL MEDICINE | Facility: IMAGING CENTER | Age: 54
End: 2019-07-29
Payer: COMMERCIAL

## 2019-07-29 VITALS
WEIGHT: 200.4 LBS | HEART RATE: 64 BPM | DIASTOLIC BLOOD PRESSURE: 78 MMHG | TEMPERATURE: 97.6 F | SYSTOLIC BLOOD PRESSURE: 125 MMHG | BODY MASS INDEX: 28.06 KG/M2 | HEIGHT: 71 IN | RESPIRATION RATE: 17 BRPM | OXYGEN SATURATION: 98 %

## 2019-07-29 DIAGNOSIS — E55.9 VITAMIN D DEFICIENCY DISEASE: Chronic | ICD-10-CM

## 2019-07-29 DIAGNOSIS — Z12.11 SCREENING FOR COLON CANCER: ICD-10-CM

## 2019-07-29 DIAGNOSIS — Z00.00 WELLNESS EXAMINATION: ICD-10-CM

## 2019-07-29 DIAGNOSIS — Z72.0 CHEWING TOBACCO USE: Chronic | ICD-10-CM

## 2019-07-29 DIAGNOSIS — R17 ELEVATED BILIRUBIN: ICD-10-CM

## 2019-07-29 DIAGNOSIS — Z71.89 ENCOUNTER FOR MEDICATION COUNSELING: ICD-10-CM

## 2019-07-29 DIAGNOSIS — Z71.85 VACCINE COUNSELING: ICD-10-CM

## 2019-07-29 DIAGNOSIS — E78.2 MIXED HYPERLIPIDEMIA WITH APOLIPOPROTEIN E3 VARIANT: Chronic | ICD-10-CM

## 2019-07-29 DIAGNOSIS — Z91.09 ENVIRONMENTAL ALLERGIES: Chronic | ICD-10-CM

## 2019-07-29 DIAGNOSIS — E88.819 INSULIN RESISTANCE: Chronic | ICD-10-CM

## 2019-07-29 PROCEDURE — 99396 PREV VISIT EST AGE 40-64: CPT | Performed by: FAMILY MEDICINE

## 2019-07-29 RX ORDER — AMPICILLIN TRIHYDRATE 250 MG
CAPSULE ORAL
COMMUNITY
End: 2020-07-13

## 2019-07-29 ASSESSMENT — PATIENT HEALTH QUESTIONNAIRE - PHQ9: CLINICAL INTERPRETATION OF PHQ2 SCORE: 0

## 2019-07-29 NOTE — PROGRESS NOTES
"Chief Complaint   Patient presents with   • Annual Exam     Review labs.       HPI:  Patient is a 54 y.o. male established patient who presents today for his annual wellness exam. He reports that his health has been stable and denies new health changes.  He continues to use chewing tobacco despite using Chantix last year (has been trying Zyn pouches for cessation attempts recently).  He has chronic insulin resistance with normal HgA1c historically, and chronic vitamin D deficiency with ongoing vitamin D supplement use.  He has chronic mixed dyslipidemia and is taking daily red yeast rice and fish oil supplements (has declined statin use in past). He is currently taking ASA 81 mg daily and is seeking direction about whether or not he should be continuing this due to recent media reports. He is due for annual FIT test (declines screening colonoscopy) and would like to review lab results done 7/26/19. He is in good spirits today and is enjoying the summer months.     Patient Active Problem List    Diagnosis Date Noted   • Insulin resistance 09/27/2016   • Mixed hyperlipidemia with apolipoprotein E3 variant 11/13/2013   • Vitamin D deficiency disease 10/10/2013   • Chewing tobacco use 10/10/2013   • BMI 27.0-27.9,adult 10/10/2013   • Family history of diabetes mellitus type II 10/10/2013   • Environmental allergies 10/10/2013       Past medical, surgical, family, and social history was reviewed and updated in Epic chart by me today.     Medications and allergies reviewed and updated in Epic chart by me today.     Lab results 7/26/19 reviewed in detail with patient at visit today.    ROS:  Pertinent positives listed above in HPI. All other systems have been reviewed and are negative.    PE:   /78 (BP Location: Left arm, Patient Position: Sitting, BP Cuff Size: Adult)   Pulse 64   Temp 36.4 °C (97.6 °F) (Temporal)   Resp 17   Ht 1.803 m (5' 11\")   Wt 90.9 kg (200 lb 6.4 oz)   SpO2 98%   BMI 27.95 kg/m² "   Vital signs reviewed with patient.     Gen: Well developed; well nourished; no acute distress; age appropriate appearance   HEENT: Normocephalic; atraumatic; PEERLA b/l; sclera clear b/l; b/l external auditory canals WNL; b/l TM WNL; nares patent; oropharynx clear; oral mucosa moist; tongue midline; dentition adequate   Neck: No adenopathy; no thyromegaly  CV: Regular rate and rhythm; S1/ S2 present; no murmur, gallop or rub noted  Pulm: No respiratory distress; clear to ascultation b/l; no wheezing or stridor noted b/l  Abd: Adequate bowel sounds noted; soft and nontender; no rebound, rigidity, nor distention   Extremities: No peripheral edema b/l LE extremities/ no clubbing nor cyanosis noted  Skin: Warm and dry; no rashes noted   Neuro: No focal deficits noted   Psych: AAOx4; mood and affect are appropriate    A/P:  1. Screening for colon cancer  Pt declines screening colonoscopy and is due for annual FIT testing. Kit provided to patient at visit today with instructions for use.  - OCCULT BLOOD FECES IMMUNOASSAY; Future    2. Vitamin D deficiency disease  Stable/ pt is to continue daily Vitamin D3 supplementation.     3. Chewing tobacco use  Ongoing issue for patient - he declines further Chantix use and has been trying Zyn pouches recently.     4. Environmental allergies  Not a current concern at this time.    5. Mixed hyperlipidemia with apolipoprotein E3 variant  Uncontrolled/ pt declines statin use and continues to use daily fish oil and red yeast rice. Recommend increased dietary adjustments and weight management efforts and repeat fasting lipid panel in 3-4 months.   - Lipid Profile; Future    6. Insulin resistance  HgA1c remains WNL at this time. Pt is aware of risk factors moving forward.     7. BMI 27.0-27.9,adult  Pt reports that he is refocusing on diet efforts and continuing with regular exercise program to lose weight.     8. Vaccine counseling  Pt provided with Shingrix information at visit today.      9. Encounter for medication counseling  We discussed pros and cons of continuing ASA 81 mg daily. Pt does not have significant risk factors to support ongoing use at this time.     10. Elevated bilirubin  Tbili is mildly elevated at 1.6 and patient is completely asymptomatic. Recommend repeating CMP with next lab draw for follow up.  - Comp Metabolic Panel; Future    11. Wellness examination  Pt is stable and is aware of health care matters that need additional attention moving forward. I completed health form for employer program and will fax accordingly.

## 2019-08-22 ENCOUNTER — HOSPITAL ENCOUNTER (OUTPATIENT)
Facility: MEDICAL CENTER | Age: 54
End: 2019-08-22
Attending: FAMILY MEDICINE
Payer: COMMERCIAL

## 2019-08-22 PROCEDURE — 82274 ASSAY TEST FOR BLOOD FECAL: CPT

## 2019-08-30 DIAGNOSIS — Z12.11 SCREENING FOR COLON CANCER: ICD-10-CM

## 2019-08-30 LAB
AMBIGUOUS DTTM AMBI4: NORMAL
HEMOCCULT STL QL IA: NEGATIVE

## 2020-07-13 ENCOUNTER — HOSPITAL ENCOUNTER (EMERGENCY)
Facility: MEDICAL CENTER | Age: 55
End: 2020-07-13
Attending: EMERGENCY MEDICINE
Payer: COMMERCIAL

## 2020-07-13 ENCOUNTER — APPOINTMENT (OUTPATIENT)
Dept: RADIOLOGY | Facility: MEDICAL CENTER | Age: 55
End: 2020-07-13
Attending: EMERGENCY MEDICINE
Payer: COMMERCIAL

## 2020-07-13 VITALS
OXYGEN SATURATION: 98 % | RESPIRATION RATE: 18 BRPM | DIASTOLIC BLOOD PRESSURE: 83 MMHG | BODY MASS INDEX: 27.47 KG/M2 | TEMPERATURE: 98 F | WEIGHT: 196.21 LBS | HEART RATE: 59 BPM | HEIGHT: 71 IN | SYSTOLIC BLOOD PRESSURE: 140 MMHG

## 2020-07-13 DIAGNOSIS — N13.2 URETERAL STONE WITH HYDRONEPHROSIS: ICD-10-CM

## 2020-07-13 DIAGNOSIS — N20.1 URETERAL STONE: ICD-10-CM

## 2020-07-13 LAB
ALBUMIN SERPL BCP-MCNC: 4.4 G/DL (ref 3.2–4.9)
ALBUMIN/GLOB SERPL: 1.4 G/DL
ALP SERPL-CCNC: 81 U/L (ref 30–99)
ALT SERPL-CCNC: 30 U/L (ref 2–50)
ANION GAP SERPL CALC-SCNC: 12 MMOL/L (ref 7–16)
APPEARANCE UR: CLEAR
AST SERPL-CCNC: 27 U/L (ref 12–45)
BACTERIA #/AREA URNS HPF: NEGATIVE /HPF
BASOPHILS # BLD AUTO: 0.3 % (ref 0–1.8)
BASOPHILS # BLD: 0.04 K/UL (ref 0–0.12)
BILIRUB SERPL-MCNC: 1.3 MG/DL (ref 0.1–1.5)
BILIRUB UR QL STRIP.AUTO: NEGATIVE
BUN SERPL-MCNC: 18 MG/DL (ref 8–22)
CALCIUM SERPL-MCNC: 9.4 MG/DL (ref 8.4–10.2)
CHLORIDE SERPL-SCNC: 104 MMOL/L (ref 96–112)
CO2 SERPL-SCNC: 22 MMOL/L (ref 20–33)
COLOR UR: YELLOW
CREAT SERPL-MCNC: 1.01 MG/DL (ref 0.5–1.4)
EOSINOPHIL # BLD AUTO: 0.02 K/UL (ref 0–0.51)
EOSINOPHIL NFR BLD: 0.1 % (ref 0–6.9)
EPI CELLS #/AREA URNS HPF: NEGATIVE /HPF
ERYTHROCYTE [DISTWIDTH] IN BLOOD BY AUTOMATED COUNT: 40.5 FL (ref 35.9–50)
GLOBULIN SER CALC-MCNC: 3.2 G/DL (ref 1.9–3.5)
GLUCOSE SERPL-MCNC: 116 MG/DL (ref 65–99)
GLUCOSE UR STRIP.AUTO-MCNC: NEGATIVE MG/DL
HCT VFR BLD AUTO: 47.9 % (ref 42–52)
HGB BLD-MCNC: 16.7 G/DL (ref 14–18)
HYALINE CASTS #/AREA URNS LPF: ABNORMAL /LPF
IMM GRANULOCYTES # BLD AUTO: 0.07 K/UL (ref 0–0.11)
IMM GRANULOCYTES NFR BLD AUTO: 0.5 % (ref 0–0.9)
KETONES UR STRIP.AUTO-MCNC: 15 MG/DL
LEUKOCYTE ESTERASE UR QL STRIP.AUTO: NEGATIVE
LIPASE SERPL-CCNC: 24 U/L (ref 7–58)
LYMPHOCYTES # BLD AUTO: 0.72 K/UL (ref 1–4.8)
LYMPHOCYTES NFR BLD: 5.2 % (ref 22–41)
MCH RBC QN AUTO: 31.9 PG (ref 27–33)
MCHC RBC AUTO-ENTMCNC: 34.9 G/DL (ref 33.7–35.3)
MCV RBC AUTO: 91.6 FL (ref 81.4–97.8)
MICRO URNS: ABNORMAL
MONOCYTES # BLD AUTO: 0.84 K/UL (ref 0–0.85)
MONOCYTES NFR BLD AUTO: 6.1 % (ref 0–13.4)
NEUTROPHILS # BLD AUTO: 12.19 K/UL (ref 1.82–7.42)
NEUTROPHILS NFR BLD: 87.8 % (ref 44–72)
NITRITE UR QL STRIP.AUTO: NEGATIVE
NRBC # BLD AUTO: 0 K/UL
NRBC BLD-RTO: 0 /100 WBC
PH UR STRIP.AUTO: 5.5 [PH] (ref 5–8)
PLATELET # BLD AUTO: 197 K/UL (ref 164–446)
PMV BLD AUTO: 10.1 FL (ref 9–12.9)
POTASSIUM SERPL-SCNC: 4.6 MMOL/L (ref 3.6–5.5)
PROT SERPL-MCNC: 7.6 G/DL (ref 6–8.2)
PROT UR QL STRIP: NEGATIVE MG/DL
RBC # BLD AUTO: 5.23 M/UL (ref 4.7–6.1)
RBC # URNS HPF: ABNORMAL /HPF
RBC UR QL AUTO: ABNORMAL
SODIUM SERPL-SCNC: 138 MMOL/L (ref 135–145)
SP GR UR STRIP.AUTO: 1.02
WBC # BLD AUTO: 13.9 K/UL (ref 4.8–10.8)
WBC #/AREA URNS HPF: ABNORMAL /HPF

## 2020-07-13 PROCEDURE — 81001 URINALYSIS AUTO W/SCOPE: CPT

## 2020-07-13 PROCEDURE — 80053 COMPREHEN METABOLIC PANEL: CPT

## 2020-07-13 PROCEDURE — 85025 COMPLETE CBC W/AUTO DIFF WBC: CPT

## 2020-07-13 PROCEDURE — 36415 COLL VENOUS BLD VENIPUNCTURE: CPT

## 2020-07-13 PROCEDURE — 700111 HCHG RX REV CODE 636 W/ 250 OVERRIDE (IP)

## 2020-07-13 PROCEDURE — 96374 THER/PROPH/DIAG INJ IV PUSH: CPT

## 2020-07-13 PROCEDURE — 700102 HCHG RX REV CODE 250 W/ 637 OVERRIDE(OP): Performed by: EMERGENCY MEDICINE

## 2020-07-13 PROCEDURE — 74176 CT ABD & PELVIS W/O CONTRAST: CPT

## 2020-07-13 PROCEDURE — A9270 NON-COVERED ITEM OR SERVICE: HCPCS | Performed by: EMERGENCY MEDICINE

## 2020-07-13 PROCEDURE — 700105 HCHG RX REV CODE 258: Performed by: EMERGENCY MEDICINE

## 2020-07-13 PROCEDURE — 83690 ASSAY OF LIPASE: CPT

## 2020-07-13 PROCEDURE — 99285 EMERGENCY DEPT VISIT HI MDM: CPT

## 2020-07-13 RX ORDER — KETOROLAC TROMETHAMINE 30 MG/ML
INJECTION, SOLUTION INTRAMUSCULAR; INTRAVENOUS
Status: COMPLETED
Start: 2020-07-13 | End: 2020-07-13

## 2020-07-13 RX ORDER — KETOROLAC TROMETHAMINE 30 MG/ML
15 INJECTION, SOLUTION INTRAMUSCULAR; INTRAVENOUS ONCE
Status: COMPLETED | OUTPATIENT
Start: 2020-07-13 | End: 2020-07-13

## 2020-07-13 RX ORDER — OXYCODONE HYDROCHLORIDE AND ACETAMINOPHEN 5; 325 MG/1; MG/1
1 TABLET ORAL ONCE
Status: COMPLETED | OUTPATIENT
Start: 2020-07-13 | End: 2020-07-13

## 2020-07-13 RX ORDER — TAMSULOSIN HYDROCHLORIDE 0.4 MG/1
0.4 CAPSULE ORAL DAILY
Qty: 30 CAP | Refills: 0 | Status: SHIPPED | OUTPATIENT
Start: 2020-07-13 | End: 2020-08-10 | Stop reason: SDUPTHER

## 2020-07-13 RX ORDER — ONDANSETRON 4 MG/1
4 TABLET, ORALLY DISINTEGRATING ORAL EVERY 8 HOURS PRN
Qty: 10 TAB | Refills: 0 | Status: SHIPPED | OUTPATIENT
Start: 2020-07-13 | End: 2020-08-10

## 2020-07-13 RX ORDER — SODIUM CHLORIDE, SODIUM LACTATE, POTASSIUM CHLORIDE, CALCIUM CHLORIDE 600; 310; 30; 20 MG/100ML; MG/100ML; MG/100ML; MG/100ML
500 INJECTION, SOLUTION INTRAVENOUS ONCE
Status: COMPLETED | OUTPATIENT
Start: 2020-07-13 | End: 2020-07-13

## 2020-07-13 RX ORDER — OXYCODONE HYDROCHLORIDE AND ACETAMINOPHEN 5; 325 MG/1; MG/1
1 TABLET ORAL EVERY 4 HOURS PRN
Qty: 15 TAB | Refills: 0 | Status: SHIPPED | OUTPATIENT
Start: 2020-07-13 | End: 2020-07-16

## 2020-07-13 RX ADMIN — SODIUM CHLORIDE, POTASSIUM CHLORIDE, SODIUM LACTATE AND CALCIUM CHLORIDE 500 ML: 600; 310; 30; 20 INJECTION, SOLUTION INTRAVENOUS at 13:28

## 2020-07-13 RX ADMIN — KETOROLAC TROMETHAMINE 15 MG: 30 INJECTION, SOLUTION INTRAMUSCULAR; INTRAVENOUS at 13:30

## 2020-07-13 RX ADMIN — KETOROLAC TROMETHAMINE 15 MG: 30 INJECTION, SOLUTION INTRAMUSCULAR at 13:30

## 2020-07-13 RX ADMIN — OXYCODONE HYDROCHLORIDE AND ACETAMINOPHEN 1 TABLET: 5; 325 TABLET ORAL at 16:25

## 2020-07-13 ASSESSMENT — FIBROSIS 4 INDEX: FIB4 SCORE: 1.39

## 2020-07-13 NOTE — ED NOTES
Med Rec completed per pt  Ok per Pt to discuss medications with visitor/s present    Allergies reviewed  No ABX in last 14 days    Pt denies taking any RX's or OTC's

## 2020-07-13 NOTE — ED PROVIDER NOTES
"ED Provider Note    CHIEF COMPLAINT  Chief Complaint   Patient presents with   • LLQ Pain     since 0400 this morning   • Nausea     intermittent       HPI  Avila Wood is a 55 y.o. male who presents left lower flank abdominal pain since 4:00 this morning.  It woke him up out of sleep.  He says that the pain comes and goes it is anywhere from a 4 to a 7.  He cannot seem to identify anything that makes the pain worse.  He has some associated nausea.  He does seem to radiate into his left flank.  He denies any fevers he does not denies any vomiting he has never had this before.        REVIEW OF SYSTEMS  positive for left flank pain nausea, negative for fevers. All other systems are negative.     PAST MEDICAL HISTORY   has a past medical history of Environmental allergies, Family history of diabetes mellitus (DM), Family history of hypertension, Hyperlipidemia, and Vitamin D deficiency disease.    SOCIAL HISTORY  Social History     Tobacco Use   • Smoking status: Never Smoker   • Smokeless tobacco: Current User     Types: Chew   Substance and Sexual Activity   • Alcohol use: Yes     Alcohol/week: 1.2 oz     Types: 2 Standard drinks or equivalent per week   • Drug use: No   • Sexual activity: Yes     Partners: Female       SURGICAL HISTORY  patient denies any surgical history    CURRENT MEDICATIONS  Home Medications     Reviewed by Natalie Lucas (Pharmacy Tech) on 07/13/20 at 1359  Med List Status: Complete   Medication Last Dose Status        Patient Emory Taking any Medications                       ALLERGIES  No Known Allergies    PHYSICAL EXAM  VITAL SIGNS: BP (!) 164/99   Pulse 64   Temp 36.4 °C (97.6 °F) (Temporal)   Resp 18   Ht 1.803 m (5' 11\")   Wt 89 kg (196 lb 3.4 oz)   SpO2 99%   BMI 27.37 kg/m² .  Constitutional: Alert in no apparent distress.  HENT: No signs of trauma, Bilateral external ears normal, Nose normal.   Eyes: Pupils are equal and reactive, Conjunctiva normal, Non-icteric. "   Neck: Normal range of motion, No tenderness, Supple, No stridor.   Cardiovascular: Regular rate and rhythm, no murmurs.   Thorax & Lungs: Normal breath sounds, No respiratory distress, No wheezing, No chest tenderness.   Abdomen: Bowel sounds normal, Soft, slight left lower quadrant tenderness, No masses, No peritoneal signs.  Skin: Warm, Dry, No erythema, No rash.   Back: No bony tenderness, mild left-sided CVA tenderness  Musculoskeletal:  no major deformities noted.   Neurologic: Alert,  No focal deficits noted.   Psychiatric: Affect normal, Judgment normal, Mood normal.       DIAGNOSTIC STUDIES / PROCEDURES      LABS  Labs Reviewed   CBC WITH DIFFERENTIAL - Abnormal; Notable for the following components:       Result Value    WBC 13.9 (*)     Neutrophils-Polys 87.80 (*)     Lymphocytes 5.20 (*)     Neutrophils (Absolute) 12.19 (*)     Lymphs (Absolute) 0.72 (*)     All other components within normal limits   COMP METABOLIC PANEL - Abnormal; Notable for the following components:    Glucose 116 (*)     All other components within normal limits   URINALYSIS,CULTURE IF INDICATED - Abnormal; Notable for the following components:    Ketones 15 (*)     Occult Blood Large (*)     All other components within normal limits   URINE MICROSCOPIC (W/UA) - Abnormal; Notable for the following components:    WBC 0-2 (*)     RBC 20-50 (*)     All other components within normal limits   LIPASE   ESTIMATED GFR   POC UA       RADIOLOGY  CT-RENAL COLIC EVALUATION(A/P W/O)   Final Result         1.  OBSTRUCTING 6.4 MM CALCULUS IS IDENTIFIED AT THE PROXIMAL END OF THE LEFT URETER CAUSING MILD LEFT HYDRONEPHROSIS.      2.  ADDITIONAL 1 MM CALCIFICATION IS NOTED IN THE LEFT KIDNEY.             Medical records reviewed for continuity of care.  Patient has primary care here no recent hospitalizations    Differential Diagnosis includes but is not limited to: Kidney stone, diverticulitis    COURSE & MEDICAL DECISION MAKING  Pertinent Labs  & Imaging studies reviewed. (See chart for details)  This is a 55-year-old gentleman that presents with left-sided flank pain.  He was uncomfortable on exam he reported this colicky nature of his pain.  He did have a slight leukocytosis.  Ultimately was able to get a urine sample and had 20-50 red cells in his urine and no evidence of infection.  Given this I felt that kidney stone was more likely.  Therefore CT renal protocol was performed.  He does have evidence of a 6.4 mm stone in the proximal ureter causing some mild hydronephrosis.  He has no evidence of infection with his urinalysis.  He will be given Percocet Zofran and Flomax to help with a trial to pass the stone.  He will be given urology follow-up information.  Patient is agreeable to this plan and will be discharged.    In prescribing controlled substances to this patient, I certify that I have obtained and reviewed the medical history of Avila Wood. I have also made a good ana effort to obtain applicable records from other providers who have treated the patient and no other records are available at this time.     I have conducted a physical exam and documented it. I have reviewed Mr. Wood’s prescription history as maintained by the [place] Prescription Monitoring Program.     I have assessed the patient’s risk for abuse, dependency, and addiction using the validated Opioid Risk Tool available at https://www.mdcalc.com/vkleiy-jlul-jjkh-ort-narcotic-abuse.     Given the above, I believe the benefits of controlled substance therapy outweigh the risks. The reasons for prescribing controlled substances include non-narcotic, oral analgesic alternatives have been inadequate for pain control. Accordingly, I have discussed the risk and benefits, treatment plan, and alternative therapies with the patient.         HYDRATION: Based on the patient's presentation of Dehydration the patient was given IV fluids. IV Hydration was used because oral hydration  was not adequate alone. Upon recheck following hydration, the patient was improved.    The patient will not drink alcohol nor drive with prescribed medications. The patient will return for new or worsening symptoms and is stable at the time of discharge. Patient was given return precautions. Patient and/or family member verbalizes understanding and will comply.    DISPOSITION:  Patient will be discharged home in stable condition.    FOLLOW UP:  Michael Gleason M.D.  [address] [facility] [address]  [phone]      Call for urology follow up    [facility], Emergency Dept  [address]  [place] [id number] [phone]    Return for worsening pain, vomiting, fever or other concerns      OUTPATIENT MEDICATIONS:  New Prescriptions    ONDANSETRON (ZOFRAN ODT) 4 MG TABLET DISPERSIBLE    Take 1 Tab by mouth every 8 hours as needed.    OXYCODONE-ACETAMINOPHEN (PERCOCET) 5-325 MG TAB    Take 1 Tab by mouth every four hours as needed for up to 3 days.    TAMSULOSIN (FLOMAX) 0.4 MG CAPSULE    Take 1 Cap by mouth every day.           FINAL IMPRESSION  1. Ureteral stone  oxyCODONE-acetaminophen (PERCOCET) 5-325 MG Tab       2.   3.    This dictation has been creating using voice recognition software. The accuracy of the dictation is limited the abilities of the software.  I expect there may be some errors of grammar and possibly content. I made every attempt to manually correct the errors within my dictation. However errors related to this voice recognition software may still exist and should be interpreted within the appropriate context.      The note accurately reflects work and decisions made by me.  Ita Morrison M.D.  7/13/2020  4:25 PM

## 2020-07-13 NOTE — ED TRIAGE NOTES
Pt amb to triage with   Chief Complaint   Patient presents with   • LLQ Pain     since 0400 this morning   • Nausea     intermittent     Pt states he woke with a sharp pain to his LLQ radiating to his back. He has waves of nausea. Denies vomiting, diarrhea, fever, urinary symptoms. Had a normal BM @ 0500 this am. Pt appears pale.

## 2020-07-13 NOTE — ED NOTES
Discharge instructions given and discussed. RX for flomax, percocet and zofran given and pt educated. Pt educated to come back to ER for new or worsening symptoms and follow up with PCP and urology as instructed. Pt verbalized understanding. Ade given, VSS. Pt  Discharged in stable condition.

## 2020-07-13 NOTE — ED NOTES
ERP at bedside. Pt agrees with plan of care discussed by ERP. AIDET acknowledged with patient. Marcella in low position, side rail up for pt safety. Call light within reach. Will continue to monitor.

## 2020-08-10 DIAGNOSIS — N13.2 URETERAL STONE WITH HYDRONEPHROSIS: ICD-10-CM

## 2020-08-10 RX ORDER — TAMSULOSIN HYDROCHLORIDE 0.4 MG/1
0.4 CAPSULE ORAL DAILY
Qty: 30 CAP | Refills: 0 | Status: SHIPPED | OUTPATIENT
Start: 2020-08-10 | End: 2020-09-15

## 2020-09-15 DIAGNOSIS — N13.2 URETERAL STONE WITH HYDRONEPHROSIS: ICD-10-CM

## 2020-09-15 RX ORDER — TAMSULOSIN HYDROCHLORIDE 0.4 MG/1
0.4 CAPSULE ORAL DAILY
Qty: 90 CAP | Refills: 3 | Status: SHIPPED | OUTPATIENT
Start: 2020-09-15 | End: 2021-09-16

## 2020-10-05 ENCOUNTER — APPOINTMENT (OUTPATIENT)
Dept: RADIOLOGY | Facility: MEDICAL CENTER | Age: 55
End: 2020-10-05
Attending: PHYSICIAN ASSISTANT
Payer: COMMERCIAL

## 2020-10-12 ENCOUNTER — HOSPITAL ENCOUNTER (OUTPATIENT)
Dept: RADIOLOGY | Facility: MEDICAL CENTER | Age: 55
End: 2020-10-12
Attending: PHYSICIAN ASSISTANT
Payer: COMMERCIAL

## 2020-10-12 DIAGNOSIS — N20.0 CALCULUS OF KIDNEY: ICD-10-CM

## 2020-10-12 PROCEDURE — 74176 CT ABD & PELVIS W/O CONTRAST: CPT

## 2021-04-21 ENCOUNTER — IMMUNIZATION (OUTPATIENT)
Dept: FAMILY PLANNING/WOMEN'S HEALTH CLINIC | Facility: IMMUNIZATION CENTER | Age: 56
End: 2021-04-21
Payer: COMMERCIAL

## 2021-04-21 DIAGNOSIS — Z23 ENCOUNTER FOR VACCINATION: Primary | ICD-10-CM

## 2021-04-21 PROCEDURE — 91300 PFIZER SARS-COV-2 VACCINE: CPT | Performed by: INTERNAL MEDICINE

## 2021-04-21 PROCEDURE — 0001A PFIZER SARS-COV-2 VACCINE: CPT | Performed by: INTERNAL MEDICINE

## 2021-05-21 ENCOUNTER — IMMUNIZATION (OUTPATIENT)
Dept: FAMILY PLANNING/WOMEN'S HEALTH CLINIC | Facility: IMMUNIZATION CENTER | Age: 56
End: 2021-05-21
Payer: COMMERCIAL

## 2021-05-21 DIAGNOSIS — Z23 ENCOUNTER FOR VACCINATION: Primary | ICD-10-CM

## 2021-05-21 PROCEDURE — 91300 PFIZER SARS-COV-2 VACCINE: CPT

## 2021-05-21 PROCEDURE — 0002A PFIZER SARS-COV-2 VACCINE: CPT

## 2021-09-07 DIAGNOSIS — E88.819 INSULIN RESISTANCE: Chronic | ICD-10-CM

## 2021-09-07 DIAGNOSIS — E78.2 MIXED HYPERLIPIDEMIA WITH APOLIPOPROTEIN E3 VARIANT: Chronic | ICD-10-CM

## 2021-09-07 DIAGNOSIS — E55.9 VITAMIN D DEFICIENCY: Chronic | ICD-10-CM

## 2021-09-07 DIAGNOSIS — Z00.00 HEALTH CARE MAINTENANCE: ICD-10-CM

## 2021-09-07 DIAGNOSIS — Z12.5 SCREENING FOR PROSTATE CANCER: ICD-10-CM

## 2021-09-09 ENCOUNTER — NON-PROVIDER VISIT (OUTPATIENT)
Dept: INTERNAL MEDICINE | Facility: IMAGING CENTER | Age: 56
End: 2021-09-09
Payer: COMMERCIAL

## 2021-09-09 ENCOUNTER — HOSPITAL ENCOUNTER (OUTPATIENT)
Facility: MEDICAL CENTER | Age: 56
End: 2021-09-09
Attending: FAMILY MEDICINE
Payer: COMMERCIAL

## 2021-09-09 DIAGNOSIS — E78.2 MIXED HYPERLIPIDEMIA WITH APOLIPOPROTEIN E3 VARIANT: Chronic | ICD-10-CM

## 2021-09-09 DIAGNOSIS — E55.9 VITAMIN D DEFICIENCY: Chronic | ICD-10-CM

## 2021-09-09 DIAGNOSIS — Z00.00 HEALTH CARE MAINTENANCE: ICD-10-CM

## 2021-09-09 DIAGNOSIS — E88.819 INSULIN RESISTANCE: Chronic | ICD-10-CM

## 2021-09-09 DIAGNOSIS — Z12.5 SCREENING FOR PROSTATE CANCER: ICD-10-CM

## 2021-09-09 PROCEDURE — 84153 ASSAY OF PSA TOTAL: CPT

## 2021-09-09 PROCEDURE — 80053 COMPREHEN METABOLIC PANEL: CPT

## 2021-09-09 PROCEDURE — 82306 VITAMIN D 25 HYDROXY: CPT

## 2021-09-09 PROCEDURE — 85025 COMPLETE CBC W/AUTO DIFF WBC: CPT

## 2021-09-09 PROCEDURE — 84443 ASSAY THYROID STIM HORMONE: CPT

## 2021-09-09 PROCEDURE — 83036 HEMOGLOBIN GLYCOSYLATED A1C: CPT

## 2021-09-09 PROCEDURE — 80061 LIPID PANEL: CPT

## 2021-09-10 LAB
25(OH)D3 SERPL-MCNC: 70 NG/ML (ref 30–100)
ALBUMIN SERPL BCP-MCNC: 4.7 G/DL (ref 3.2–4.9)
ALBUMIN/GLOB SERPL: 1.5 G/DL
ALP SERPL-CCNC: 78 U/L (ref 30–99)
ALT SERPL-CCNC: 30 U/L (ref 2–50)
ANION GAP SERPL CALC-SCNC: 13 MMOL/L (ref 7–16)
AST SERPL-CCNC: 27 U/L (ref 12–45)
BASOPHILS # BLD AUTO: 0.6 % (ref 0–1.8)
BASOPHILS # BLD: 0.04 K/UL (ref 0–0.12)
BILIRUB SERPL-MCNC: 0.9 MG/DL (ref 0.1–1.5)
BUN SERPL-MCNC: 15 MG/DL (ref 8–22)
CALCIUM SERPL-MCNC: 9.9 MG/DL (ref 8.5–10.5)
CHLORIDE SERPL-SCNC: 102 MMOL/L (ref 96–112)
CHOLEST SERPL-MCNC: 215 MG/DL (ref 100–199)
CO2 SERPL-SCNC: 23 MMOL/L (ref 20–33)
CREAT SERPL-MCNC: 0.87 MG/DL (ref 0.5–1.4)
EOSINOPHIL # BLD AUTO: 0.16 K/UL (ref 0–0.51)
EOSINOPHIL NFR BLD: 2.6 % (ref 0–6.9)
ERYTHROCYTE [DISTWIDTH] IN BLOOD BY AUTOMATED COUNT: 45.2 FL (ref 35.9–50)
EST. AVERAGE GLUCOSE BLD GHB EST-MCNC: 108 MG/DL
GLOBULIN SER CALC-MCNC: 3.1 G/DL (ref 1.9–3.5)
GLUCOSE SERPL-MCNC: 86 MG/DL (ref 65–99)
HBA1C MFR BLD: 5.4 % (ref 4–5.6)
HCT VFR BLD AUTO: 50.4 % (ref 42–52)
HDLC SERPL-MCNC: 44 MG/DL
HGB BLD-MCNC: 16.7 G/DL (ref 14–18)
IMM GRANULOCYTES # BLD AUTO: 0.03 K/UL (ref 0–0.11)
IMM GRANULOCYTES NFR BLD AUTO: 0.5 % (ref 0–0.9)
LDLC SERPL CALC-MCNC: 122 MG/DL
LYMPHOCYTES # BLD AUTO: 1.92 K/UL (ref 1–4.8)
LYMPHOCYTES NFR BLD: 30.9 % (ref 22–41)
MCH RBC QN AUTO: 32.4 PG (ref 27–33)
MCHC RBC AUTO-ENTMCNC: 33.1 G/DL (ref 33.7–35.3)
MCV RBC AUTO: 97.9 FL (ref 81.4–97.8)
MONOCYTES # BLD AUTO: 0.62 K/UL (ref 0–0.85)
MONOCYTES NFR BLD AUTO: 10 % (ref 0–13.4)
NEUTROPHILS # BLD AUTO: 3.45 K/UL (ref 1.82–7.42)
NEUTROPHILS NFR BLD: 55.4 % (ref 44–72)
NRBC # BLD AUTO: 0 K/UL
NRBC BLD-RTO: 0 /100 WBC
PLATELET # BLD AUTO: 201 K/UL (ref 164–446)
PMV BLD AUTO: 11.1 FL (ref 9–12.9)
POTASSIUM SERPL-SCNC: 4.2 MMOL/L (ref 3.6–5.5)
PROT SERPL-MCNC: 7.8 G/DL (ref 6–8.2)
PSA SERPL-MCNC: 3.03 NG/ML (ref 0–4)
RBC # BLD AUTO: 5.15 M/UL (ref 4.7–6.1)
SODIUM SERPL-SCNC: 138 MMOL/L (ref 135–145)
TRIGL SERPL-MCNC: 245 MG/DL (ref 0–149)
TSH SERPL DL<=0.005 MIU/L-ACNC: 1.3 UIU/ML (ref 0.38–5.33)
WBC # BLD AUTO: 6.2 K/UL (ref 4.8–10.8)

## 2021-09-16 ENCOUNTER — OFFICE VISIT (OUTPATIENT)
Dept: INTERNAL MEDICINE | Facility: IMAGING CENTER | Age: 56
End: 2021-09-16
Payer: COMMERCIAL

## 2021-09-16 VITALS
RESPIRATION RATE: 17 BRPM | DIASTOLIC BLOOD PRESSURE: 70 MMHG | SYSTOLIC BLOOD PRESSURE: 120 MMHG | HEART RATE: 89 BPM | BODY MASS INDEX: 27.44 KG/M2 | TEMPERATURE: 98 F | OXYGEN SATURATION: 97 % | WEIGHT: 196 LBS | HEIGHT: 71 IN

## 2021-09-16 DIAGNOSIS — Z71.85 VACCINE COUNSELING: ICD-10-CM

## 2021-09-16 DIAGNOSIS — Z12.12 SCREENING FOR COLORECTAL CANCER: ICD-10-CM

## 2021-09-16 DIAGNOSIS — Z00.00 WELLNESS EXAMINATION: ICD-10-CM

## 2021-09-16 DIAGNOSIS — Z12.11 SCREENING FOR COLORECTAL CANCER: ICD-10-CM

## 2021-09-16 DIAGNOSIS — E78.2 MIXED HYPERLIPIDEMIA WITH APOLIPOPROTEIN E3 VARIANT: Chronic | ICD-10-CM

## 2021-09-16 DIAGNOSIS — E88.819 INSULIN RESISTANCE: Chronic | ICD-10-CM

## 2021-09-16 PROCEDURE — 99396 PREV VISIT EST AGE 40-64: CPT | Performed by: FAMILY MEDICINE

## 2021-09-16 ASSESSMENT — FIBROSIS 4 INDEX: FIB4 SCORE: 1.32

## 2021-09-16 ASSESSMENT — PATIENT HEALTH QUESTIONNAIRE - PHQ9: CLINICAL INTERPRETATION OF PHQ2 SCORE: 0

## 2021-09-16 NOTE — PROGRESS NOTES
"Chief Complaint   Patient presents with   • Annual Exam     Review labs   • Referral Needed     GI for colonoscopy. And [facility] for skin check and skin tags       HPI:  Patient is a 56 y.o. male established patient who presents today for his annual wellness exam and to review labs done 9/9/2021. He has history of chronic mixed dyslipidemia without statin use, and history of insulin resistance. He denies new medical complaints, exercises daily, and is enjoying correction with his wife. He is due for screening colonoscopy and would like a dermatology referral for annual skin checks. He is in good spirits today and is playing golf at [employer] this afternoon.     Patient Active Problem List    Diagnosis Date Noted   • Insulin resistance 09/27/2016   • Mixed hyperlipidemia with apolipoprotein E3 variant 11/13/2013   • Chewing tobacco use 10/10/2013   • Family history of diabetes mellitus type II 10/10/2013   • Environmental allergies 10/10/2013       Past medical, surgical, family, and social history was reviewed and updated in Epic chart by me today.     Medications and allergies reviewed and updated in Epic chart by me today.     Lab results 9/9/2021 reviewed with patient at visit today.     ROS:  Pertinent positives listed above in HPI. All other systems have been reviewed and are negative.    PE:   /70 (BP Location: Left arm, Patient Position: Sitting, BP Cuff Size: Adult)   Pulse 89   Temp 36.7 °C (98 °F) (Temporal)   Resp 17   Ht 1.803 m (5' 11\")   Wt 88.9 kg (196 lb)   SpO2 97%   BMI 27.34 kg/m²   Vital signs reviewed with patient.     Gen: Well developed; well nourished; no acute distress; age appropriate appearance   HEENT: Normocephalic; atraumatic; PEERLA b/l; sclera clear b/l; b/l external auditory canals WNL; b/l TM WNL; nares patent; oropharynx clear; mask in place  Neck: No adenopathy; no thyromegaly  CV: Regular rate and rhythm; S1/ S2 present; no murmur, gallop or rub " noted  Pulm: No respiratory distress; clear to ascultation b/l; no wheezing or stridor noted b/l  Abd: Adequate bowel sounds noted; soft and nontender; no rebound, rigidity, nor distention  Extremities: No peripheral edema b/l LE extremities/ no clubbing nor cyanosis noted  Skin: Warm and dry; no rashes noted   Neuro: No focal deficits noted   Psych: AAOx4; mood and affect are appropriate    A/P:  1. Screening for colorectal cancer  Patient is due for screening colonoscopy, and referral to [facility] made at appointment today.   - REFERRAL TO GI FOR COLONOSCOPY    2. Mixed hyperlipidemia with apolipoprotein E3 variant  Uncontrolled/ we discussed making necessary changes to diet, continue regular exercise, and repeat fasting lipid panel in 3-4 months for ongoing management. If repeat lab results remain elevated, will initiate medication management at that time.   - Lipid Profile; Future    3. Insulin resistance  Improved overall at this time     4. Vaccine counseling  Patient is aware of Shingrix and influenza vaccine eligibility.     5. Wellness examination  Patient is stable overall at this time and is well informed about health conditions that need additional attention moving forward. Referral made to [facility] for annual skin checks.   - REFERRAL TO DERMATOLOGY

## 2022-01-04 ENCOUNTER — HOSPITAL ENCOUNTER (EMERGENCY)
Facility: MEDICAL CENTER | Age: 57
End: 2022-01-04
Attending: EMERGENCY MEDICINE
Payer: COMMERCIAL

## 2022-01-04 VITALS
BODY MASS INDEX: 27.93 KG/M2 | OXYGEN SATURATION: 94 % | DIASTOLIC BLOOD PRESSURE: 89 MMHG | WEIGHT: 199.52 LBS | RESPIRATION RATE: 18 BRPM | SYSTOLIC BLOOD PRESSURE: 155 MMHG | HEIGHT: 71 IN | HEART RATE: 64 BPM | TEMPERATURE: 97.9 F

## 2022-01-04 DIAGNOSIS — S61.411A LACERATION OF RIGHT HAND WITHOUT FOREIGN BODY, INITIAL ENCOUNTER: ICD-10-CM

## 2022-01-04 PROCEDURE — 700101 HCHG RX REV CODE 250: Performed by: EMERGENCY MEDICINE

## 2022-01-04 PROCEDURE — 304217 HCHG IRRIGATION SYSTEM

## 2022-01-04 PROCEDURE — 700111 HCHG RX REV CODE 636 W/ 250 OVERRIDE (IP): Performed by: EMERGENCY MEDICINE

## 2022-01-04 PROCEDURE — 99282 EMERGENCY DEPT VISIT SF MDM: CPT

## 2022-01-04 PROCEDURE — 304999 HCHG REPAIR-SIMPLE/INTERMED LEVEL 1

## 2022-01-04 PROCEDURE — 90471 IMMUNIZATION ADMIN: CPT

## 2022-01-04 PROCEDURE — 90715 TDAP VACCINE 7 YRS/> IM: CPT | Performed by: EMERGENCY MEDICINE

## 2022-01-04 PROCEDURE — 303747 HCHG EXTRA SUTURE

## 2022-01-04 RX ORDER — BUPIVACAINE HYDROCHLORIDE 5 MG/ML
10 INJECTION, SOLUTION EPIDURAL; INTRACAUDAL ONCE
Status: COMPLETED | OUTPATIENT
Start: 2022-01-04 | End: 2022-01-04

## 2022-01-04 RX ADMIN — BUPIVACAINE HYDROCHLORIDE 10 ML: 5 INJECTION, SOLUTION EPIDURAL; INTRACAUDAL; PERINEURAL at 16:45

## 2022-01-04 RX ADMIN — CLOSTRIDIUM TETANI TOXOID ANTIGEN (FORMALDEHYDE INACTIVATED), CORYNEBACTERIUM DIPHTHERIAE TOXOID ANTIGEN (FORMALDEHYDE INACTIVATED), BORDETELLA PERTUSSIS TOXOID ANTIGEN (GLUTARALDEHYDE INACTIVATED), BORDETELLA PERTUSSIS FILAMENTOUS HEMAGGLUTININ ANTIGEN (FORMALDEHYDE INACTIVATED), BORDETELLA PERTUSSIS PERTACTIN ANTIGEN, AND BORDETELLA PERTUSSIS FIMBRIAE 2/3 ANTIGEN 0.5 ML: 5; 2; 2.5; 5; 3; 5 INJECTION, SUSPENSION INTRAMUSCULAR at 17:26

## 2022-01-04 ASSESSMENT — FIBROSIS 4 INDEX: FIB4 SCORE: 1.32

## 2022-01-04 NOTE — ED TRIAGE NOTES
"Chief Complaint   Patient presents with   • Hand Laceration     Right, caught on piece of metal on a gate, pressure drsg in place from      /93   Pulse 67   Temp 36.7 °C (98.1 °F) (Temporal)   Resp 14   Ht 1.803 m (5' 11\")   Wt 90.5 kg (199 lb 8.3 oz)   SpO2 98%   BMI 27.83 kg/m²      Has this patient been vaccinated for COVID YES   If not, would they like to be vaccinated while in the ER if eligible?  na  Would the patient like to speak with the ERP about the possibility of receiving the COVID vaccine today before making a decision? Na    Pt ambulated to ED w/ visitor for c/o laceration Right Palm caught on piece of metal on a gate.  Pt went to [facility] and was told would need to be seen in ED.  Bleeding controlled at this time.    Last Tetanus in 10/2013 per Immunization Hx.    "

## 2022-01-05 NOTE — ED NOTES
Er tech to bedside  For wound cleaning post suturing prior to application of dressing by same. Pt for d/c after same

## 2022-01-05 NOTE — ED NOTES
NO weakness or numbness in hand. Laceration happened at 8:00am, went to urgent care first, was refused and sent here.

## 2022-01-05 NOTE — DISCHARGE INSTRUCTIONS
Laceration Care    Keep wound dry 2 days, bandage with antibiotic ointment 3 days, keep it clean afterwards and protect the wound from sunburn 9 months.  See your physician, go to urgent care or return for infection and to remove one running sutures/staples in 10 days.    You had a borderline or high normal blood pressure reading today.  This does not necessarily mean you have hypertension.  Please followup with your/a primary physician for comprehensive blood pressure evaluation and yearly fasting cholesterol assessment.  BP Readings from Last 3 Encounters:   01/04/22 146/93   09/16/21 120/70   07/13/20 140/83         A laceration is a cut or lesion that goes through all layers of the skin and into the tissue just beneath the skin.    This has been repaired by your caregiver. Your caregiver used either suturing, stapling, or steri-strips to repair the laceration. This brings the skin margins together. This allows faster healing.    HOME CARE INSTRUCTIONS  If you were given a dressing, you should change it at least once a day, or as instructed by your caregiver. If the bandage sticks, soak it off with a solution of hydrogen peroxide or soapy water.  Twice a day, wash the area with soap and water to remove all the creams or ointments if these were used. Rinse off the soap. Pat dry with a clean towel. Look for signs of infection (see below).  Re-apply prescribed creams or ointments as instructed. This will help prevent infection. This also helps keep the bandage from sticking. Telfa over the wound and under the dressing or wrap will also help keep the bandage from sticking.  If the bandage becomes wet, dirty, or develops a foul smell, change it as soon as possible.  Acetaminophen (Tylenol®) or ibuprofen (Advil® or Motrin®) may be taken as directed for relief from pain and discomfort.    Seek medical attention IF:  There is redness, swelling, increasing pain in the wound  There is a red line that goes up your arm or  leg.  Pus is coming from wound.  You develop an unexplained oral temperature above 101.  You notice a foul smell coming from the wound or dressing.  There is a breaking open of the wound  (edges not staying together) after sutures have been removed.    If you did not receive a tetanus shot today because you did not recall when your last one was given, make sure to check with your caregiver when you have your sutures removed to determine if you need one.      [employer]® Patient Information ©2007 [employer], [employer].

## 2022-01-05 NOTE — ED PROVIDER NOTES
"ED Provider Note    CHIEF COMPLAINT   Chief Complaint   Patient presents with   • Hand Laceration     Right, caught on piece of metal on a gate, pressure drsg in place from [facility]   Avila Conrad Wood is a 56 y.o. male who presents with a right dominant palm laceration inflicted by a piece of metal on a gate while he was walking his dog approximately 6 hours ago.  He went to urgent care where they told him they could sew his wounds and after a couple hour wait they told him they could not sew his palm wound.  He was sent here and is waited a couple hours in the ER here to be seen.  He is right-hand dominant.  Bleeding is controlled, no blood thinner use.  No weakness numbness or loss of range of motion.  No other injuries.    REVIEW OF SYSTEMS   Pertinent positives include:Tetanus in 2013, right palm laceration.  Pertinent negatives include: Weakness, numbness, loss of range of motion.     PAST MEDICAL HISTORY   Past Medical History:   Diagnosis Date   • Environmental allergies    • Family history of diabetes mellitus (DM)    • Family history of hypertension    • Hyperlipidemia    • Vitamin D deficiency disease        CURRENT MEDICATIONS   Current Outpatient Medications:   •  Cholecalciferol (VITAMIN D3 PO), Take  by mouth., Disp: , Rfl:       ALLERGIES   No Known Allergies    PHYSICAL EXAM  VITAL SIGNS: /93   Pulse 67   Temp 36.7 °C (98.1 °F) (Temporal)   Resp 14   Ht 1.803 m (5' 11\")   Wt 90.5 kg (199 lb 8.3 oz)   SpO2 98%   BMI 27.83 kg/m²   Constitutional: Well developed, Well nourished, elevated blood pressure, afebrile, well-appearing.   HENT: Atraumatic.  Respiratory: Rate and excursion normal.  Musculoskeletal: No bony deformity of the hand.  Finger flexion and extension preserved at the MCP, PIP and DIP joints of all fingers..  Skin: Proximate 3 cm laceration on palm near the thenar eminence transverse below index and middle fingers.  Extends to fascia.  Neurologic: Sensation intact by " two-point testing on the involved fingers..     COURSE & MEDICAL DECISION MAKING  Procedure: Wound repair.  Patient anesthetized with 2 cc of 0.5% bupivacaine with standard sterile technique and a median nerve block.  The radial margin of the wound was subsequently reinforced with local injection of 1 more cc of bupivacaine.  The wound was irrigated with 500 cc of normal saline.  Skin was prepped with povidine.  Standard sterile technique was employed to close the 3 cm wound in 1 layer with one running 4-0 nylon suture.  Wound was explored to base and appears to extend only to the fascia.  Patient tolerated the procedure well.  To be bandaged with antibiotic ointment and sterile gauze dressing.    Patient presents with a palm laceration to the fascia that is about the limit of age for suturing.  Patient verbalized understanding that he is at slightly increased risk of wound infection.  There is no evidence of tendon nerve vascular or joint injury.    PLAN:   Laceration instructions  Suture removal 10 days  Follow-up for infection    CONDITION: good    FINAL IMPRESSION  1. Laceration of right hand without foreign body, initial encounter    3 cm palm sutured in 1 layer        Electronically signed by: Trace Wong M.D., 1/4/2022 4:16 PM

## 2022-01-14 ENCOUNTER — NON-PROVIDER VISIT (OUTPATIENT)
Dept: INTERNAL MEDICINE | Facility: IMAGING CENTER | Age: 57
End: 2022-01-14

## 2022-12-28 ENCOUNTER — OFFICE VISIT (OUTPATIENT)
Dept: INTERNAL MEDICINE | Facility: IMAGING CENTER | Age: 57
End: 2022-12-28
Payer: COMMERCIAL

## 2022-12-28 VITALS
WEIGHT: 203 LBS | HEIGHT: 71 IN | SYSTOLIC BLOOD PRESSURE: 132 MMHG | TEMPERATURE: 98.2 F | HEART RATE: 75 BPM | RESPIRATION RATE: 17 BRPM | OXYGEN SATURATION: 95 % | BODY MASS INDEX: 28.42 KG/M2 | DIASTOLIC BLOOD PRESSURE: 72 MMHG

## 2022-12-28 DIAGNOSIS — E78.2 MIXED HYPERLIPIDEMIA WITH APOLIPOPROTEIN E3 VARIANT: Chronic | ICD-10-CM

## 2022-12-28 DIAGNOSIS — E88.819 INSULIN RESISTANCE: Chronic | ICD-10-CM

## 2022-12-28 DIAGNOSIS — Z00.00 WELLNESS EXAMINATION: ICD-10-CM

## 2022-12-28 DIAGNOSIS — Z00.00 HEALTH CARE MAINTENANCE: ICD-10-CM

## 2022-12-28 DIAGNOSIS — Z91.09 ENVIRONMENTAL ALLERGIES: Chronic | ICD-10-CM

## 2022-12-28 PROCEDURE — 99396 PREV VISIT EST AGE 40-64: CPT | Performed by: FAMILY MEDICINE

## 2022-12-28 ASSESSMENT — PATIENT HEALTH QUESTIONNAIRE - PHQ9: CLINICAL INTERPRETATION OF PHQ2 SCORE: 0

## 2022-12-28 ASSESSMENT — FIBROSIS 4 INDEX: FIB4 SCORE: 1.35

## 2023-01-01 NOTE — PROGRESS NOTES
"  Chief Complaint   Patient presents with    Annual Exam       HPI:  Patient is a 57 y.o. male established patient who presents today for his annual exam. He and his wife have resumed services at [facility], and he denies new health concerns. He is aware of vaccine eligibility, does not take any prescription medications at this time, and denies new mental health concerns. He is UTD with dermatology care and colon cancer screening and is in good spirits at our visit today.     Patient Active Problem List    Diagnosis Date Noted    Insulin resistance 09/27/2016    Mixed hyperlipidemia with apolipoprotein E3 variant 11/13/2013    Chewing tobacco use 10/10/2013    Family history of diabetes mellitus type II 10/10/2013    Environmental allergies 10/10/2013       Past medical, surgical, family, and social history was reviewed and updated in Epic chart by me today.     Medications and allergies reviewed and updated in Epic chart by me today.     ROS:  Pertinent positives listed above in HPI. All other systems have been reviewed and are negative.    PE:   /72 (BP Location: Left arm, Patient Position: Sitting, BP Cuff Size: Adult)   Pulse 75   Temp 36.8 °C (98.2 °F) (Temporal)   Resp 17   Ht 1.803 m (5' 11\")   Wt 92.1 kg (203 lb)   SpO2 95%   BMI 28.31 kg/m²   Vital signs reviewed with patient.     Gen: Well developed; well nourished; no acute distress; age appropriate appearance   HEENT: Normocephalic; atraumatic; PEERLA b/l; sclera clear b/l; b/l external auditory canals WNL; b/l TM WNL; nares patent; oropharynx clear; mask in place  Neck: No adenopathy; no thyromegaly  CV: Regular rate and rhythm; S1/ S2 present; no murmur, gallop or rub noted  Pulm: No respiratory distress; clear to ascultation b/l; no wheezing or stridor noted b/l  Abd: Adequate bowel sounds noted; soft and nontender; no rebound, rigidity, nor distention  Extremities: No peripheral edema b/l LE extremities/ no clubbing nor cyanosis " noted  Skin: Warm and dry; no rashes noted   Neuro: No focal deficits noted   Psych: AAOx4; mood and affect are appropriate    A/P:  1. Environmental allergies  Controlled with ongoing allergy control medication use.     2. Insulin resistance  Patient remains well informed about dietary and lifestyle strategies for management.     3. Mixed hyperlipidemia with apolipoprotein E3 variant  Patient endorses ongoing red yeast rice use and has updated statin results at home - will send me data in near future.    4. Health care maintenance  Patient reports having fasting labs done in 2022 and will send me the reports for review.     5. Wellness examination  Patient is stable overall and continues to lead an active/healthy lifestyle.

## 2024-02-01 DIAGNOSIS — U07.1 COVID-19: ICD-10-CM

## 2024-10-07 DIAGNOSIS — Z00.00 HEALTH CARE MAINTENANCE: ICD-10-CM

## 2024-10-07 DIAGNOSIS — E78.2 MIXED HYPERLIPIDEMIA WITH APOLIPOPROTEIN E3 VARIANT: Chronic | ICD-10-CM

## 2024-10-09 ENCOUNTER — NON-PROVIDER VISIT (OUTPATIENT)
Dept: INTERNAL MEDICINE | Facility: IMAGING CENTER | Age: 59
End: 2024-10-09
Payer: COMMERCIAL

## 2024-10-09 ENCOUNTER — HOSPITAL ENCOUNTER (OUTPATIENT)
Facility: MEDICAL CENTER | Age: 59
End: 2024-10-09
Attending: FAMILY MEDICINE
Payer: COMMERCIAL

## 2024-10-09 DIAGNOSIS — E78.2 MIXED HYPERLIPIDEMIA WITH APOLIPOPROTEIN E3 VARIANT: Chronic | ICD-10-CM

## 2024-10-09 DIAGNOSIS — Z00.00 HEALTH CARE MAINTENANCE: ICD-10-CM

## 2024-10-09 LAB
25(OH)D3 SERPL-MCNC: 75 NG/ML (ref 30–100)
ALBUMIN SERPL BCP-MCNC: 4.6 G/DL (ref 3.2–4.9)
ALBUMIN/GLOB SERPL: 1.5 G/DL
ALP SERPL-CCNC: 104 U/L (ref 30–99)
ALT SERPL-CCNC: 63 U/L (ref 2–50)
ANION GAP SERPL CALC-SCNC: 12 MMOL/L (ref 7–16)
AST SERPL-CCNC: 34 U/L (ref 12–45)
BASOPHILS # BLD AUTO: 0.6 % (ref 0–1.8)
BASOPHILS # BLD: 0.04 K/UL (ref 0–0.12)
BILIRUB SERPL-MCNC: 1.3 MG/DL (ref 0.1–1.5)
BUN SERPL-MCNC: 16 MG/DL (ref 8–22)
CALCIUM ALBUM COR SERPL-MCNC: 9.4 MG/DL (ref 8.5–10.5)
CALCIUM SERPL-MCNC: 9.9 MG/DL (ref 8.5–10.5)
CHLORIDE SERPL-SCNC: 106 MMOL/L (ref 96–112)
CHOLEST SERPL-MCNC: 239 MG/DL (ref 100–199)
CO2 SERPL-SCNC: 21 MMOL/L (ref 20–33)
CREAT SERPL-MCNC: 0.85 MG/DL (ref 0.5–1.4)
EOSINOPHIL # BLD AUTO: 0.16 K/UL (ref 0–0.51)
EOSINOPHIL NFR BLD: 2.6 % (ref 0–6.9)
ERYTHROCYTE [DISTWIDTH] IN BLOOD BY AUTOMATED COUNT: 41.6 FL (ref 35.9–50)
GFR SERPLBLD CREATININE-BSD FMLA CKD-EPI: 101 ML/MIN/1.73 M 2
GLOBULIN SER CALC-MCNC: 3 G/DL (ref 1.9–3.5)
GLUCOSE SERPL-MCNC: 103 MG/DL (ref 65–99)
HCT VFR BLD AUTO: 50.9 % (ref 42–52)
HDLC SERPL-MCNC: 49 MG/DL
HGB BLD-MCNC: 17.5 G/DL (ref 14–18)
IMM GRANULOCYTES # BLD AUTO: 0.02 K/UL (ref 0–0.11)
IMM GRANULOCYTES NFR BLD AUTO: 0.3 % (ref 0–0.9)
LDLC SERPL CALC-MCNC: 171 MG/DL
LYMPHOCYTES # BLD AUTO: 2.01 K/UL (ref 1–4.8)
LYMPHOCYTES NFR BLD: 32.3 % (ref 22–41)
MCH RBC QN AUTO: 31.8 PG (ref 27–33)
MCHC RBC AUTO-ENTMCNC: 34.4 G/DL (ref 32.3–36.5)
MCV RBC AUTO: 92.5 FL (ref 81.4–97.8)
MONOCYTES # BLD AUTO: 0.58 K/UL (ref 0–0.85)
MONOCYTES NFR BLD AUTO: 9.3 % (ref 0–13.4)
NEUTROPHILS # BLD AUTO: 3.41 K/UL (ref 1.82–7.42)
NEUTROPHILS NFR BLD: 54.9 % (ref 44–72)
NRBC # BLD AUTO: 0 K/UL
NRBC BLD-RTO: 0 /100 WBC (ref 0–0.2)
PLATELET # BLD AUTO: 220 K/UL (ref 164–446)
PMV BLD AUTO: 10.5 FL (ref 9–12.9)
POTASSIUM SERPL-SCNC: 4.3 MMOL/L (ref 3.6–5.5)
PROT SERPL-MCNC: 7.6 G/DL (ref 6–8.2)
PSA SERPL-MCNC: 3.13 NG/ML (ref 0–4)
RBC # BLD AUTO: 5.5 M/UL (ref 4.7–6.1)
SODIUM SERPL-SCNC: 139 MMOL/L (ref 135–145)
T4 FREE SERPL-MCNC: 1.21 NG/DL (ref 0.93–1.7)
TRIGL SERPL-MCNC: 94 MG/DL (ref 0–149)
TSH SERPL-ACNC: 1.49 UIU/ML (ref 0.35–5.5)
WBC # BLD AUTO: 6.2 K/UL (ref 4.8–10.8)

## 2024-10-09 PROCEDURE — 84439 ASSAY OF FREE THYROXINE: CPT

## 2024-10-09 PROCEDURE — 80061 LIPID PANEL: CPT

## 2024-10-09 PROCEDURE — 80053 COMPREHEN METABOLIC PANEL: CPT

## 2024-10-09 PROCEDURE — 84153 ASSAY OF PSA TOTAL: CPT

## 2024-10-09 PROCEDURE — 85025 COMPLETE CBC W/AUTO DIFF WBC: CPT

## 2024-10-09 PROCEDURE — 82306 VITAMIN D 25 HYDROXY: CPT

## 2024-10-09 PROCEDURE — 83036 HEMOGLOBIN GLYCOSYLATED A1C: CPT

## 2024-10-09 PROCEDURE — 84443 ASSAY THYROID STIM HORMONE: CPT

## 2024-10-10 LAB
EST. AVERAGE GLUCOSE BLD GHB EST-MCNC: 108 MG/DL
HBA1C MFR BLD: 5.4 % (ref 4–5.6)

## 2024-10-16 ENCOUNTER — OFFICE VISIT (OUTPATIENT)
Dept: INTERNAL MEDICINE | Facility: IMAGING CENTER | Age: 59
End: 2024-10-16
Payer: COMMERCIAL

## 2024-10-16 VITALS
DIASTOLIC BLOOD PRESSURE: 62 MMHG | SYSTOLIC BLOOD PRESSURE: 132 MMHG | HEART RATE: 70 BPM | TEMPERATURE: 98.6 F | WEIGHT: 196.8 LBS | OXYGEN SATURATION: 96 % | BODY MASS INDEX: 27.55 KG/M2 | RESPIRATION RATE: 17 BRPM | HEIGHT: 71 IN

## 2024-10-16 DIAGNOSIS — E78.2 MIXED HYPERLIPIDEMIA WITH APOLIPOPROTEIN E3 VARIANT: Chronic | ICD-10-CM

## 2024-10-16 DIAGNOSIS — Z00.00 WELLNESS EXAMINATION: ICD-10-CM

## 2024-10-16 DIAGNOSIS — E88.819 INSULIN RESISTANCE: Chronic | ICD-10-CM

## 2024-10-16 DIAGNOSIS — R74.01 ELEVATED ALT MEASUREMENT: ICD-10-CM

## 2024-10-16 DIAGNOSIS — Z00.00 HEALTH CARE MAINTENANCE: ICD-10-CM

## 2024-10-16 DIAGNOSIS — Z83.3 FAMILY HISTORY OF DIABETES MELLITUS TYPE II: ICD-10-CM

## 2024-10-16 DIAGNOSIS — R14.3 FLATUS: ICD-10-CM

## 2024-10-16 PROCEDURE — 99396 PREV VISIT EST AGE 40-64: CPT | Performed by: FAMILY MEDICINE

## 2024-10-16 PROCEDURE — 99214 OFFICE O/P EST MOD 30 MIN: CPT | Mod: 25 | Performed by: FAMILY MEDICINE

## 2024-10-16 ASSESSMENT — FIBROSIS 4 INDEX: FIB4 SCORE: 1.11

## 2024-10-16 ASSESSMENT — PATIENT HEALTH QUESTIONNAIRE - PHQ9: CLINICAL INTERPRETATION OF PHQ2 SCORE: 0

## 2024-10-31 ENCOUNTER — APPOINTMENT (RX ONLY)
Dept: URBAN - METROPOLITAN AREA CLINIC 15 | Facility: CLINIC | Age: 59
Setting detail: DERMATOLOGY
End: 2024-10-31

## 2024-10-31 DIAGNOSIS — D18.0 HEMANGIOMA: ICD-10-CM

## 2024-10-31 DIAGNOSIS — L73.8 OTHER SPECIFIED FOLLICULAR DISORDERS: ICD-10-CM

## 2024-10-31 DIAGNOSIS — D22 MELANOCYTIC NEVI: ICD-10-CM

## 2024-10-31 DIAGNOSIS — Z71.89 OTHER SPECIFIED COUNSELING: ICD-10-CM

## 2024-10-31 DIAGNOSIS — L82.1 OTHER SEBORRHEIC KERATOSIS: ICD-10-CM

## 2024-10-31 DIAGNOSIS — L81.4 OTHER MELANIN HYPERPIGMENTATION: ICD-10-CM

## 2024-10-31 PROBLEM — D22.5 MELANOCYTIC NEVI OF TRUNK: Status: ACTIVE | Noted: 2024-10-31

## 2024-10-31 PROBLEM — D18.01 HEMANGIOMA OF SKIN AND SUBCUTANEOUS TISSUE: Status: ACTIVE | Noted: 2024-10-31

## 2024-10-31 PROBLEM — D22.0 MELANOCYTIC NEVI OF LIP: Status: ACTIVE | Noted: 2024-10-31

## 2024-10-31 PROCEDURE — 99203 OFFICE O/P NEW LOW 30 MIN: CPT

## 2024-10-31 PROCEDURE — ? OBSERVATION AND MEASURE

## 2024-10-31 PROCEDURE — ? COUNSELING

## 2024-10-31 ASSESSMENT — LOCATION ZONE DERM
LOCATION ZONE: LIP
LOCATION ZONE: FACE
LOCATION ZONE: TRUNK

## 2024-10-31 ASSESSMENT — LOCATION DETAILED DESCRIPTION DERM
LOCATION DETAILED: LEFT SUPERIOR CENTRAL MALAR CHEEK
LOCATION DETAILED: LEFT UPPER CUTANEOUS LIP
LOCATION DETAILED: RIGHT MID-UPPER BACK
LOCATION DETAILED: LEFT MEDIAL INFERIOR CHEST
LOCATION DETAILED: RIGHT CENTRAL MALAR CHEEK

## 2024-10-31 ASSESSMENT — LOCATION SIMPLE DESCRIPTION DERM
LOCATION SIMPLE: LEFT LIP
LOCATION SIMPLE: CHEST
LOCATION SIMPLE: RIGHT UPPER BACK
LOCATION SIMPLE: RIGHT CHEEK
LOCATION SIMPLE: LEFT CHEEK

## 2024-10-31 NOTE — PROCEDURE: OBSERVATION
Detail Level: Detailed
Size Of Lesion: 5mm
Morphology Per Location (Optional): Brown macule
Size Of Lesion: 7mm